# Patient Record
Sex: MALE | Race: WHITE | NOT HISPANIC OR LATINO | Employment: FULL TIME | URBAN - METROPOLITAN AREA
[De-identification: names, ages, dates, MRNs, and addresses within clinical notes are randomized per-mention and may not be internally consistent; named-entity substitution may affect disease eponyms.]

---

## 2017-01-07 ENCOUNTER — COMMUNICATION - HEALTHEAST (OUTPATIENT)
Dept: FAMILY MEDICINE | Facility: CLINIC | Age: 37
End: 2017-01-07

## 2017-01-07 DIAGNOSIS — G47.00 INSOMNIA: ICD-10-CM

## 2017-01-07 DIAGNOSIS — F41.1 ANXIETY STATE: ICD-10-CM

## 2017-03-04 ENCOUNTER — COMMUNICATION - HEALTHEAST (OUTPATIENT)
Dept: FAMILY MEDICINE | Facility: CLINIC | Age: 37
End: 2017-03-04

## 2017-03-04 DIAGNOSIS — F41.1 ANXIETY STATE: ICD-10-CM

## 2017-04-04 ENCOUNTER — COMMUNICATION - HEALTHEAST (OUTPATIENT)
Dept: FAMILY MEDICINE | Facility: CLINIC | Age: 37
End: 2017-04-04

## 2017-04-04 DIAGNOSIS — G47.00 INSOMNIA: ICD-10-CM

## 2017-04-28 ENCOUNTER — COMMUNICATION - HEALTHEAST (OUTPATIENT)
Dept: FAMILY MEDICINE | Facility: CLINIC | Age: 37
End: 2017-04-28

## 2017-04-28 DIAGNOSIS — J45.909 ASTHMA: ICD-10-CM

## 2017-05-05 ENCOUNTER — OFFICE VISIT - HEALTHEAST (OUTPATIENT)
Dept: FAMILY MEDICINE | Facility: CLINIC | Age: 37
End: 2017-05-05

## 2017-05-05 DIAGNOSIS — K21.9 ACID REFLUX: ICD-10-CM

## 2017-05-05 DIAGNOSIS — J30.9 ALLERGIC RHINITIS: ICD-10-CM

## 2017-05-05 DIAGNOSIS — M54.50 LOW BACK PAIN: ICD-10-CM

## 2017-05-05 DIAGNOSIS — M25.532 LEFT WRIST PAIN: ICD-10-CM

## 2017-05-05 DIAGNOSIS — J45.30 MILD PERSISTENT ASTHMA WITHOUT COMPLICATION: ICD-10-CM

## 2017-05-05 DIAGNOSIS — R13.10 DYSPHAGIA: ICD-10-CM

## 2017-05-05 DIAGNOSIS — R14.0 ABDOMINAL BLOATING: ICD-10-CM

## 2017-05-18 ENCOUNTER — COMMUNICATION - HEALTHEAST (OUTPATIENT)
Dept: FAMILY MEDICINE | Facility: CLINIC | Age: 37
End: 2017-05-18

## 2017-05-18 DIAGNOSIS — G47.00 INSOMNIA: ICD-10-CM

## 2017-05-18 DIAGNOSIS — F41.1 ANXIETY STATE: ICD-10-CM

## 2017-06-26 ENCOUNTER — HOSPITAL ENCOUNTER (OUTPATIENT)
Dept: PHYSICAL MEDICINE AND REHAB | Facility: CLINIC | Age: 37
Discharge: HOME | End: 2017-06-26
Attending: FAMILY MEDICINE

## 2017-06-26 DIAGNOSIS — M54.2 CERVICALGIA: ICD-10-CM

## 2017-06-26 DIAGNOSIS — M54.6 THORACIC BACK PAIN: ICD-10-CM

## 2017-06-26 DIAGNOSIS — M54.50 LUMBALGIA: ICD-10-CM

## 2017-06-26 DIAGNOSIS — M79.18 MYOFASCIAL PAIN: ICD-10-CM

## 2017-06-26 ASSESSMENT — MIFFLIN-ST. JEOR: SCORE: 1906.24

## 2017-07-18 ENCOUNTER — COMMUNICATION - HEALTHEAST (OUTPATIENT)
Dept: FAMILY MEDICINE | Facility: CLINIC | Age: 37
End: 2017-07-18

## 2017-07-18 DIAGNOSIS — R52 PAIN: ICD-10-CM

## 2017-07-18 DIAGNOSIS — F41.1 ANXIETY STATE: ICD-10-CM

## 2017-07-18 DIAGNOSIS — M25.561 RIGHT KNEE PAIN, UNSPECIFIED CHRONICITY: ICD-10-CM

## 2017-07-18 DIAGNOSIS — M25.532 LEFT WRIST PAIN: ICD-10-CM

## 2017-07-18 DIAGNOSIS — M25.511 RIGHT SHOULDER PAIN: ICD-10-CM

## 2017-07-20 ENCOUNTER — COMMUNICATION - HEALTHEAST (OUTPATIENT)
Dept: FAMILY MEDICINE | Facility: CLINIC | Age: 37
End: 2017-07-20

## 2017-07-20 DIAGNOSIS — K21.9 GERD (GASTROESOPHAGEAL REFLUX DISEASE): ICD-10-CM

## 2017-10-09 ENCOUNTER — COMMUNICATION - HEALTHEAST (OUTPATIENT)
Dept: FAMILY MEDICINE | Facility: CLINIC | Age: 37
End: 2017-10-09

## 2017-10-09 DIAGNOSIS — F41.1 ANXIETY STATE: ICD-10-CM

## 2017-11-17 ENCOUNTER — COMMUNICATION - HEALTHEAST (OUTPATIENT)
Dept: PHYSICAL MEDICINE AND REHAB | Facility: CLINIC | Age: 37
End: 2017-11-17

## 2017-11-17 DIAGNOSIS — M79.18 MYOFASCIAL PAIN: ICD-10-CM

## 2017-11-17 DIAGNOSIS — M54.2 CERVICALGIA: ICD-10-CM

## 2017-11-17 DIAGNOSIS — M54.6 THORACIC BACK PAIN: ICD-10-CM

## 2017-11-17 DIAGNOSIS — M54.50 LUMBALGIA: ICD-10-CM

## 2017-12-16 ENCOUNTER — COMMUNICATION - HEALTHEAST (OUTPATIENT)
Dept: FAMILY MEDICINE | Facility: CLINIC | Age: 37
End: 2017-12-16

## 2017-12-16 DIAGNOSIS — F41.1 ANXIETY STATE: ICD-10-CM

## 2018-02-22 ENCOUNTER — COMMUNICATION - HEALTHEAST (OUTPATIENT)
Dept: FAMILY MEDICINE | Facility: CLINIC | Age: 38
End: 2018-02-22

## 2018-03-29 ENCOUNTER — COMMUNICATION - HEALTHEAST (OUTPATIENT)
Dept: FAMILY MEDICINE | Facility: CLINIC | Age: 38
End: 2018-03-29

## 2018-03-29 DIAGNOSIS — F41.1 ANXIETY STATE: ICD-10-CM

## 2018-04-25 ENCOUNTER — COMMUNICATION - HEALTHEAST (OUTPATIENT)
Dept: FAMILY MEDICINE | Facility: CLINIC | Age: 38
End: 2018-04-25

## 2018-04-25 DIAGNOSIS — G47.00 INSOMNIA: ICD-10-CM

## 2018-05-08 ENCOUNTER — COMMUNICATION - HEALTHEAST (OUTPATIENT)
Dept: FAMILY MEDICINE | Facility: CLINIC | Age: 38
End: 2018-05-08

## 2018-05-08 DIAGNOSIS — F41.1 ANXIETY STATE: ICD-10-CM

## 2018-05-25 ENCOUNTER — COMMUNICATION - HEALTHEAST (OUTPATIENT)
Dept: FAMILY MEDICINE | Facility: CLINIC | Age: 38
End: 2018-05-25

## 2018-05-25 DIAGNOSIS — J45.30 MILD PERSISTENT ASTHMA: ICD-10-CM

## 2018-05-30 ENCOUNTER — COMMUNICATION - HEALTHEAST (OUTPATIENT)
Dept: FAMILY MEDICINE | Facility: CLINIC | Age: 38
End: 2018-05-30

## 2018-05-30 DIAGNOSIS — J45.909 ASTHMA: ICD-10-CM

## 2018-07-08 ENCOUNTER — COMMUNICATION - HEALTHEAST (OUTPATIENT)
Dept: FAMILY MEDICINE | Facility: CLINIC | Age: 38
End: 2018-07-08

## 2018-07-08 DIAGNOSIS — J45.909 ASTHMA: ICD-10-CM

## 2018-07-30 ENCOUNTER — COMMUNICATION - HEALTHEAST (OUTPATIENT)
Dept: FAMILY MEDICINE | Facility: CLINIC | Age: 38
End: 2018-07-30

## 2018-07-30 DIAGNOSIS — G47.00 INSOMNIA: ICD-10-CM

## 2018-08-30 ENCOUNTER — COMMUNICATION - HEALTHEAST (OUTPATIENT)
Dept: FAMILY MEDICINE | Facility: CLINIC | Age: 38
End: 2018-08-30

## 2018-08-30 DIAGNOSIS — F41.1 ANXIETY STATE: ICD-10-CM

## 2018-09-25 ENCOUNTER — AMBULATORY - HEALTHEAST (OUTPATIENT)
Dept: FAMILY MEDICINE | Facility: CLINIC | Age: 38
End: 2018-09-25

## 2018-09-25 ENCOUNTER — OFFICE VISIT - HEALTHEAST (OUTPATIENT)
Dept: FAMILY MEDICINE | Facility: CLINIC | Age: 38
End: 2018-09-25

## 2018-09-25 DIAGNOSIS — M54.50 BILATERAL LOW BACK PAIN WITHOUT SCIATICA: ICD-10-CM

## 2018-09-25 DIAGNOSIS — M79.671 RIGHT FOOT PAIN: ICD-10-CM

## 2018-09-25 DIAGNOSIS — G47.00 INSOMNIA: ICD-10-CM

## 2018-09-25 DIAGNOSIS — J45.909 ASTHMA: ICD-10-CM

## 2018-09-25 DIAGNOSIS — F41.1 ANXIETY STATE: ICD-10-CM

## 2018-09-25 DIAGNOSIS — K21.9 GERD (GASTROESOPHAGEAL REFLUX DISEASE): ICD-10-CM

## 2018-09-25 DIAGNOSIS — M25.511 RIGHT SHOULDER PAIN: ICD-10-CM

## 2018-09-25 DIAGNOSIS — R10.84 ABDOMINAL PAIN, GENERALIZED: ICD-10-CM

## 2018-09-25 DIAGNOSIS — M25.561 RIGHT KNEE PAIN: ICD-10-CM

## 2018-09-25 DIAGNOSIS — J45.30 MILD PERSISTENT ASTHMA: ICD-10-CM

## 2018-09-25 LAB — TSH SERPL DL<=0.005 MIU/L-ACNC: 0.96 UIU/ML (ref 0.3–5)

## 2018-09-26 ENCOUNTER — COMMUNICATION - HEALTHEAST (OUTPATIENT)
Dept: PHYSICAL MEDICINE AND REHAB | Facility: CLINIC | Age: 38
End: 2018-09-26

## 2018-09-26 DIAGNOSIS — M54.2 CERVICALGIA: ICD-10-CM

## 2018-09-26 DIAGNOSIS — M54.50 LUMBALGIA: ICD-10-CM

## 2018-09-26 DIAGNOSIS — M54.6 THORACIC BACK PAIN: ICD-10-CM

## 2018-09-26 DIAGNOSIS — M79.18 MYOFASCIAL PAIN: ICD-10-CM

## 2018-09-27 ENCOUNTER — COMMUNICATION - HEALTHEAST (OUTPATIENT)
Dept: FAMILY MEDICINE | Facility: CLINIC | Age: 38
End: 2018-09-27

## 2018-09-27 LAB
GLIADIN IGA SER-ACNC: 0.9 U/ML
GLIADIN IGG SER-ACNC: 0.7 U/ML
IGA SERPL-MCNC: 162 MG/DL (ref 65–400)
TTG IGA SER-ACNC: 0.3 U/ML
TTG IGG SER-ACNC: <0.6 U/ML

## 2018-12-27 ENCOUNTER — COMMUNICATION - HEALTHEAST (OUTPATIENT)
Dept: FAMILY MEDICINE | Facility: CLINIC | Age: 38
End: 2018-12-27

## 2018-12-27 DIAGNOSIS — F41.1 ANXIETY STATE: ICD-10-CM

## 2019-02-12 ENCOUNTER — COMMUNICATION - HEALTHEAST (OUTPATIENT)
Dept: FAMILY MEDICINE | Facility: CLINIC | Age: 39
End: 2019-02-12

## 2019-02-12 DIAGNOSIS — F41.1 ANXIETY STATE: ICD-10-CM

## 2019-02-14 ENCOUNTER — COMMUNICATION - HEALTHEAST (OUTPATIENT)
Dept: FAMILY MEDICINE | Facility: CLINIC | Age: 39
End: 2019-02-14

## 2019-02-14 DIAGNOSIS — G47.00 INSOMNIA: ICD-10-CM

## 2019-03-05 ENCOUNTER — RECORDS - HEALTHEAST (OUTPATIENT)
Dept: ADMINISTRATIVE | Facility: OTHER | Age: 39
End: 2019-03-05

## 2019-04-10 ENCOUNTER — COMMUNICATION - HEALTHEAST (OUTPATIENT)
Dept: FAMILY MEDICINE | Facility: CLINIC | Age: 39
End: 2019-04-10

## 2019-04-10 DIAGNOSIS — F41.1 ANXIETY STATE: ICD-10-CM

## 2019-04-30 ENCOUNTER — COMMUNICATION - HEALTHEAST (OUTPATIENT)
Dept: FAMILY MEDICINE | Facility: CLINIC | Age: 39
End: 2019-04-30

## 2019-04-30 DIAGNOSIS — J45.30 MILD PERSISTENT ASTHMA: ICD-10-CM

## 2019-06-19 ENCOUNTER — COMMUNICATION - HEALTHEAST (OUTPATIENT)
Dept: FAMILY MEDICINE | Facility: CLINIC | Age: 39
End: 2019-06-19

## 2019-06-19 DIAGNOSIS — F41.1 ANXIETY STATE: ICD-10-CM

## 2019-06-23 ENCOUNTER — COMMUNICATION - HEALTHEAST (OUTPATIENT)
Dept: FAMILY MEDICINE | Facility: CLINIC | Age: 39
End: 2019-06-23

## 2019-06-23 DIAGNOSIS — J45.909 ASTHMA: ICD-10-CM

## 2019-06-25 ENCOUNTER — COMMUNICATION - HEALTHEAST (OUTPATIENT)
Dept: FAMILY MEDICINE | Facility: CLINIC | Age: 39
End: 2019-06-25

## 2019-06-25 DIAGNOSIS — J45.30 MILD PERSISTENT ASTHMA: ICD-10-CM

## 2019-07-21 ENCOUNTER — COMMUNICATION - HEALTHEAST (OUTPATIENT)
Dept: FAMILY MEDICINE | Facility: CLINIC | Age: 39
End: 2019-07-21

## 2019-07-21 DIAGNOSIS — J45.909 ASTHMA: ICD-10-CM

## 2019-07-28 ENCOUNTER — COMMUNICATION - HEALTHEAST (OUTPATIENT)
Dept: FAMILY MEDICINE | Facility: CLINIC | Age: 39
End: 2019-07-28

## 2019-07-28 DIAGNOSIS — J45.30 MILD PERSISTENT ASTHMA: ICD-10-CM

## 2019-08-16 ENCOUNTER — COMMUNICATION - HEALTHEAST (OUTPATIENT)
Dept: FAMILY MEDICINE | Facility: CLINIC | Age: 39
End: 2019-08-16

## 2019-08-16 DIAGNOSIS — F41.1 ANXIETY STATE: ICD-10-CM

## 2019-09-30 ENCOUNTER — COMMUNICATION - HEALTHEAST (OUTPATIENT)
Dept: FAMILY MEDICINE | Facility: CLINIC | Age: 39
End: 2019-09-30

## 2019-09-30 DIAGNOSIS — K21.9 GERD (GASTROESOPHAGEAL REFLUX DISEASE): ICD-10-CM

## 2019-11-20 ENCOUNTER — COMMUNICATION - HEALTHEAST (OUTPATIENT)
Dept: FAMILY MEDICINE | Facility: CLINIC | Age: 39
End: 2019-11-20

## 2019-11-20 DIAGNOSIS — G47.00 INSOMNIA: ICD-10-CM

## 2019-11-22 ENCOUNTER — COMMUNICATION - HEALTHEAST (OUTPATIENT)
Dept: FAMILY MEDICINE | Facility: CLINIC | Age: 39
End: 2019-11-22

## 2019-11-22 DIAGNOSIS — F41.1 ANXIETY STATE: ICD-10-CM

## 2019-12-06 ENCOUNTER — COMMUNICATION - HEALTHEAST (OUTPATIENT)
Dept: FAMILY MEDICINE | Facility: CLINIC | Age: 39
End: 2019-12-06

## 2019-12-06 DIAGNOSIS — F41.1 ANXIETY STATE: ICD-10-CM

## 2019-12-06 DIAGNOSIS — J45.30 MILD PERSISTENT ASTHMA: ICD-10-CM

## 2020-01-03 ENCOUNTER — COMMUNICATION - HEALTHEAST (OUTPATIENT)
Dept: FAMILY MEDICINE | Facility: CLINIC | Age: 40
End: 2020-01-03

## 2020-01-03 DIAGNOSIS — J45.909 ASTHMA: ICD-10-CM

## 2020-02-14 ENCOUNTER — COMMUNICATION - HEALTHEAST (OUTPATIENT)
Dept: FAMILY MEDICINE | Facility: CLINIC | Age: 40
End: 2020-02-14

## 2020-02-14 DIAGNOSIS — J45.909 ASTHMA: ICD-10-CM

## 2020-02-16 ENCOUNTER — COMMUNICATION - HEALTHEAST (OUTPATIENT)
Dept: FAMILY MEDICINE | Facility: CLINIC | Age: 40
End: 2020-02-16

## 2020-02-16 DIAGNOSIS — F41.1 ANXIETY STATE: ICD-10-CM

## 2020-03-11 ENCOUNTER — COMMUNICATION - HEALTHEAST (OUTPATIENT)
Dept: FAMILY MEDICINE | Facility: CLINIC | Age: 40
End: 2020-03-11

## 2020-03-11 DIAGNOSIS — J45.909 ASTHMA: ICD-10-CM

## 2020-03-15 RX ORDER — FLUTICASONE PROPIONATE AND SALMETEROL XINAFOATE 230; 21 UG/1; UG/1
AEROSOL, METERED RESPIRATORY (INHALATION)
Qty: 1 INHALER | Refills: 0 | Status: SHIPPED | OUTPATIENT
Start: 2020-03-15 | End: 2022-08-01

## 2020-03-23 ENCOUNTER — OFFICE VISIT - HEALTHEAST (OUTPATIENT)
Dept: FAMILY MEDICINE | Facility: CLINIC | Age: 40
End: 2020-03-23

## 2020-03-23 DIAGNOSIS — M25.511 CHRONIC RIGHT SHOULDER PAIN: ICD-10-CM

## 2020-03-23 DIAGNOSIS — J30.9 ALLERGIC RHINITIS, UNSPECIFIED SEASONALITY, UNSPECIFIED TRIGGER: ICD-10-CM

## 2020-03-23 DIAGNOSIS — G89.29 CHRONIC PAIN OF RIGHT KNEE: ICD-10-CM

## 2020-03-23 DIAGNOSIS — G89.29 CHRONIC RIGHT SHOULDER PAIN: ICD-10-CM

## 2020-03-23 DIAGNOSIS — J45.30 MILD PERSISTENT ASTHMA WITHOUT COMPLICATION: ICD-10-CM

## 2020-03-23 DIAGNOSIS — M25.561 CHRONIC PAIN OF RIGHT KNEE: ICD-10-CM

## 2020-03-23 DIAGNOSIS — F41.1 ANXIETY STATE: ICD-10-CM

## 2020-06-18 ENCOUNTER — COMMUNICATION - HEALTHEAST (OUTPATIENT)
Dept: FAMILY MEDICINE | Facility: CLINIC | Age: 40
End: 2020-06-18

## 2020-06-18 DIAGNOSIS — F41.1 ANXIETY STATE: ICD-10-CM

## 2020-09-22 ENCOUNTER — COMMUNICATION - HEALTHEAST (OUTPATIENT)
Dept: FAMILY MEDICINE | Facility: CLINIC | Age: 40
End: 2020-09-22

## 2020-09-22 DIAGNOSIS — G47.00 INSOMNIA: ICD-10-CM

## 2020-09-23 ENCOUNTER — COMMUNICATION - HEALTHEAST (OUTPATIENT)
Dept: FAMILY MEDICINE | Facility: CLINIC | Age: 40
End: 2020-09-23

## 2020-09-23 DIAGNOSIS — J45.30 MILD PERSISTENT ASTHMA WITHOUT COMPLICATION: ICD-10-CM

## 2020-09-24 RX ORDER — TRAZODONE HYDROCHLORIDE 50 MG/1
TABLET, FILM COATED ORAL
Qty: 180 TABLET | Refills: 3 | Status: SHIPPED | OUTPATIENT
Start: 2020-09-24 | End: 2021-08-24

## 2020-10-23 ENCOUNTER — COMMUNICATION - HEALTHEAST (OUTPATIENT)
Dept: FAMILY MEDICINE | Facility: CLINIC | Age: 40
End: 2020-10-23

## 2020-10-23 DIAGNOSIS — K21.9 GERD (GASTROESOPHAGEAL REFLUX DISEASE): ICD-10-CM

## 2021-02-05 ENCOUNTER — COMMUNICATION - HEALTHEAST (OUTPATIENT)
Dept: FAMILY MEDICINE | Facility: CLINIC | Age: 41
End: 2021-02-05

## 2021-02-05 DIAGNOSIS — F41.1 ANXIETY STATE: ICD-10-CM

## 2021-02-05 DIAGNOSIS — J45.30 MILD PERSISTENT ASTHMA WITHOUT COMPLICATION: ICD-10-CM

## 2021-03-01 ENCOUNTER — COMMUNICATION - HEALTHEAST (OUTPATIENT)
Dept: FAMILY MEDICINE | Facility: CLINIC | Age: 41
End: 2021-03-01

## 2021-03-01 DIAGNOSIS — J45.30 MILD PERSISTENT ASTHMA WITHOUT COMPLICATION: ICD-10-CM

## 2021-03-26 ENCOUNTER — COMMUNICATION - HEALTHEAST (OUTPATIENT)
Dept: FAMILY MEDICINE | Facility: CLINIC | Age: 41
End: 2021-03-26

## 2021-03-26 DIAGNOSIS — J45.30 MILD PERSISTENT ASTHMA WITHOUT COMPLICATION: ICD-10-CM

## 2021-05-05 ENCOUNTER — COMMUNICATION - HEALTHEAST (OUTPATIENT)
Dept: FAMILY MEDICINE | Facility: CLINIC | Age: 41
End: 2021-05-05

## 2021-05-05 DIAGNOSIS — F41.1 ANXIETY STATE: ICD-10-CM

## 2021-05-05 DIAGNOSIS — J45.30 MILD PERSISTENT ASTHMA WITHOUT COMPLICATION: ICD-10-CM

## 2021-05-06 ENCOUNTER — COMMUNICATION - HEALTHEAST (OUTPATIENT)
Dept: FAMILY MEDICINE | Facility: CLINIC | Age: 41
End: 2021-05-06

## 2021-05-27 NOTE — TELEPHONE ENCOUNTER
Controlled Substance Refill Request  Medication:   Requested Prescriptions     Pending Prescriptions Disp Refills     LORazepam (ATIVAN) 1 MG tablet [Pharmacy Med Name: LORAZEPAM 1 MG TABLET] 10 tablet 0     Sig: TAKE 1 TABLET BY MOUTH EVERY 6 HOURS AS NEEDED FOR ANXIETY. NEEDS OFFICE VISIT.     Date Last Fill: 12/31/18  [id number]   Submit electronically to pharmacy  Controlled Substance Agreement on File:   Encounter-Level CSA Scan Date:    There are no encounter-level csa scan date.       Last office visit: Last office visit pertaining to requested medication was 9/25/18

## 2021-05-28 ASSESSMENT — ASTHMA QUESTIONNAIRES: ACT_TOTALSCORE: 20

## 2021-05-28 NOTE — TELEPHONE ENCOUNTER
Refill Approved    Rx renewed per Medication Renewal Policy. Medication was last renewed on 9/25/18.    Ledy Grey, [facility] Triage/Med Refill 4/30/2019     Requested Prescriptions   Pending Prescriptions Disp Refills     albuterol (PROAIR HFA;PROVENTIL HFA;VENTOLIN HFA) 90 mcg/actuation inhaler [Pharmacy Med Name: ALBUTEROL HFA (PROAIR) INHALER] 8.5 Inhaler 5     Sig: INHALE 2 PUFFS INTO THE LUNGS EVERY 6 HOURS AS NEEDED FOR WHEEZING       Albuterol/Levalbuterol Refill Protocol Passed - 4/30/2019  1:35 AM        Passed - PCP or prescribing provider visit in last year     Last office visit with prescriber/PCP: 9/25/2018 Anibal Staton MD OR same dept: 9/25/2018 Anibal Staton MD OR same specialty: 9/25/2018 Anibal Staton MD Last physical: Visit date not found       Next appt within 3 mo: Visit date not found  Next physical within 3 mo: Visit date not found  Prescriber OR PCP: Anibal Staton MD  Last diagnosis associated with med order: 1. Mild persistent asthma  - albuterol (PROAIR HFA;PROVENTIL HFA;VENTOLIN HFA) 90 mcg/actuation inhaler [Pharmacy Med Name: ALBUTEROL HFA (PROAIR) INHALER]; INHALE 2 PUFFS INTO THE LUNGS EVERY 6 HOURS AS NEEDED FOR WHEEZING  Dispense: 8.5 Inhaler; Refill: 5    If protocol passes may refill for 6 months if within 3 months of last provider visit (or a total of 9 months). If patient requesting >1 inhaler per month refill x 6 months and have patient make appointment with provider.

## 2021-05-29 ENCOUNTER — COMMUNICATION - HEALTHEAST (OUTPATIENT)
Dept: FAMILY MEDICINE | Facility: CLINIC | Age: 41
End: 2021-05-29

## 2021-05-29 DIAGNOSIS — J45.30 MILD PERSISTENT ASTHMA WITHOUT COMPLICATION: ICD-10-CM

## 2021-05-29 NOTE — TELEPHONE ENCOUNTER
RN cannot approve Refill Request    RN can NOT refill this medication Protocol failed and NO refill given. Last office visit: 9/25/2018 Anibal Staton MD Last Physical: Visit date not found Last MTM visit: Visit date not found Last visit same specialty: 9/25/2018 Anibal Staton MD.  Next visit within 3 mo: Visit date not found  Next physical within 3 mo: Visit date not found      Tracey Pang, [facility] Triage/Med Refill 6/23/2019    Requested Prescriptions   Pending Prescriptions Disp Refills     ADVAIR -21 mcg/actuation inhaler [Pharmacy Med Name: ADVAIR -21 MCG INHALER] 12 Inhaler 0     Sig: TAKE 2 PUFFS BY MOUTH TWICE A DAY-DUE TO BE SEEN FOR FURTHER REFILLS       There is no refill protocol information for this order

## 2021-05-30 VITALS — BODY MASS INDEX: 31.15 KG/M2 | WEIGHT: 214 LBS

## 2021-05-30 RX ORDER — ALBUTEROL SULFATE 90 UG/1
AEROSOL, METERED RESPIRATORY (INHALATION)
Qty: 18 INHALER | Refills: 0 | Status: SHIPPED | OUTPATIENT
Start: 2021-05-30 | End: 2022-09-06

## 2021-05-30 NOTE — TELEPHONE ENCOUNTER
Refill Approved    Rx renewed per Medication Renewal Policy. Medication was last renewed on 4/30/19.    Ledy Grey, [facility] Triage/Med Refill 6/26/2019     Requested Prescriptions   Pending Prescriptions Disp Refills     albuterol (PROAIR HFA;PROVENTIL HFA;VENTOLIN HFA) 90 mcg/actuation inhaler [Pharmacy Med Name: ALBUTEROL HFA (PROAIR) INHALER] 17 Inhaler 2     Sig: INHALE 2 PUFFS INTO THE LUNGS EVERY 6 HOURS AS NEEDED FOR WHEEZING       Albuterol/Levalbuterol Refill Protocol Passed - 6/25/2019  1:33 AM        Passed - PCP or prescribing provider visit in last year     Last office visit with prescriber/PCP: 9/25/2018 Anibal Staton MD OR same dept: 9/25/2018 Anibal Staton MD OR same specialty: 9/25/2018 Anibal Staton MD Last physical: Visit date not found       Next appt within 3 mo: Visit date not found  Next physical within 3 mo: Visit date not found  Prescriber OR PCP: Anibal Staton MD  Last diagnosis associated with med order: 1. Mild persistent asthma  - albuterol (PROAIR HFA;PROVENTIL HFA;VENTOLIN HFA) 90 mcg/actuation inhaler [Pharmacy Med Name: ALBUTEROL HFA (PROAIR) INHALER]; INHALE 2 PUFFS INTO THE LUNGS EVERY 6 HOURS AS NEEDED FOR WHEEZING  Dispense: 17 Inhaler; Refill: 2    If protocol passes may refill for 6 months if within 3 months of last provider visit (or a total of 9 months). If patient requesting >1 inhaler per month refill x 6 months and have patient make appointment with provider.

## 2021-05-30 NOTE — TELEPHONE ENCOUNTER
RN cannot approve Refill Request    RN can NOT refill this medication med is not covered by policy/route to provider. Last office visit: 9/25/2018 Anibal Staton MD Last Physical: Visit date not found Last MTM visit: Visit date not found Last visit same specialty: 9/25/2018 Anibal Staton MD.  Next visit within 3 mo: Visit date not found  Next physical within 3 mo: Visit date not found      Beverly Srinivasan, [facility] Triage/Med Refill 7/21/2019    Requested Prescriptions   Pending Prescriptions Disp Refills     ADVAIR -21 mcg/actuation inhaler [Pharmacy Med Name: ADVAIR -21 MCG INHALER] 12 Inhaler 0     Sig: TAKE 2 PUFFS BY MOUTH TWICE A DAY-DUE TO BE SEEN FOR FURTHER REFILLS       There is no refill protocol information for this order

## 2021-05-30 NOTE — TELEPHONE ENCOUNTER
Refill Approved    Rx renewed per Medication Renewal Policy. Medication was last renewed on 6/26/19, last OV 9/25/18.    Tracey Pang, [facility] Triage/Med Refill 7/28/2019     Requested Prescriptions   Pending Prescriptions Disp Refills     albuterol (PROAIR HFA;PROVENTIL HFA;VENTOLIN HFA) 90 mcg/actuation inhaler [Pharmacy Med Name: ALBUTEROL HFA (PROAIR) INHALER] 8.5 Inhaler 0     Sig: TAKE 2 PUFFS BY MOUTH EVERY 6 HOURS AS NEEDED FOR WHEEZE       Albuterol/Levalbuterol Refill Protocol Passed - 7/28/2019  8:37 AM        Passed - PCP or prescribing provider visit in last year     Last office visit with prescriber/PCP: 9/25/2018 Anibal Staton MD OR same dept: 9/25/2018 Anibal Staton MD OR same specialty: 9/25/2018 Anibal Staton MD Last physical: Visit date not found       Next appt within 3 mo: Visit date not found  Next physical within 3 mo: Visit date not found  Prescriber OR PCP: Anibal Staton MD  Last diagnosis associated with med order: 1. Mild persistent asthma  - albuterol (PROAIR HFA;PROVENTIL HFA;VENTOLIN HFA) 90 mcg/actuation inhaler [Pharmacy Med Name: ALBUTEROL HFA (PROAIR) INHALER]; TAKE 2 PUFFS BY MOUTH EVERY 6 HOURS AS NEEDED FOR WHEEZE  Dispense: 8.5 Inhaler; Refill: 0    If protocol passes may refill for 6 months if within 3 months of last provider visit (or a total of 9 months). If patient requesting >1 inhaler per month refill x 6 months and have patient make appointment with provider.

## 2021-05-31 VITALS — WEIGHT: 217.8 LBS | HEIGHT: 70 IN | BODY MASS INDEX: 31.18 KG/M2

## 2021-05-31 NOTE — TELEPHONE ENCOUNTER
Controlled Substance Refill Request  Medication:   Requested Prescriptions     Pending Prescriptions Disp Refills     LORazepam (ATIVAN) 1 MG tablet [Pharmacy Med Name: LORAZEPAM 1 MG TABLET] 10 tablet 0     Sig: TAKE 1 TABLET BY MOUTH EVERY 6 HOURS AS NEEDED FOR ANXIETY. NEEDS OFFICE VISIT.     Date Last Fill: 2/14/19  [id number]   Submit electronically to pharmacy  Controlled Substance Agreement on File:   Encounter-Level CSA Scan Date:    There are no encounter-level csa scan date.       Last office visit: Last office visit pertaining to requested medication was 9/25/18.

## 2021-06-01 VITALS — WEIGHT: 219 LBS | BODY MASS INDEX: 31.88 KG/M2

## 2021-06-01 NOTE — TELEPHONE ENCOUNTER
RN cannot approve Refill Request    RN can NOT refill this medication Protocol failed and NO refill given        Ledy Grey, [facility] Triage/Med Refill 9/30/2019    Requested Prescriptions   Pending Prescriptions Disp Refills     omeprazole (PRILOSEC) 20 MG capsule [Pharmacy Med Name: OMEPRAZOLE DR 20 MG CAPSULE] 90 capsule 3     Sig: TAKE 1 CAPSULE BY MOUTH EVERY DAY       GI Medications Refill Protocol Failed - 9/30/2019  1:58 AM        Failed - PCP or prescribing provider visit in last 12 or next 3 months.     Last office visit with prescriber/PCP: 9/25/2018 Anibal Staton MD OR same dept: Visit date not found OR same specialty: 9/25/2018 Anibal Staton MD  Last physical: Visit date not found Last MTM visit: Visit date not found   Next visit within 3 mo: Visit date not found  Next physical within 3 mo: Visit date not found  Prescriber OR PCP: Anibal Staton MD  Last diagnosis associated with med order: 1. GERD (gastroesophageal reflux disease)  - omeprazole (PRILOSEC) 20 MG capsule [Pharmacy Med Name: OMEPRAZOLE DR 20 MG CAPSULE]; TAKE 1 CAPSULE BY MOUTH EVERY DAY  Dispense: 90 capsule; Refill: 3    If protocol passes may refill for 12 months if within 3 months of last provider visit (or a total of 15 months).

## 2021-06-03 NOTE — TELEPHONE ENCOUNTER
RN cannot approve Refill Request    RN can NOT refill this medication Protocol failed and NO refill given.     Ldey Grey, [facility] Triage/Med Refill 11/21/2019    Requested Prescriptions   Pending Prescriptions Disp Refills     traZODone (DESYREL) 50 MG tablet [Pharmacy Med Name: TRAZODONE 50 MG TABLET] 180 tablet 3     Sig: TAKE 1 TO 2 TABLETS BY MOUTH EVERY DAY AT BEDTIME       Tricyclics/Misc Antidepressant/Antianxiety Meds Refill Protocol Failed - 11/20/2019  2:23 AM        Failed - PCP or prescribing provider visit in last year     Last office visit with prescriber/PCP: 9/25/2018 Anibal Staton MD OR same dept: Visit date not found OR same specialty: 9/25/2018 Anibal Staton MD  Last physical: Visit date not found Last MTM visit: Visit date not found   Next visit within 3 mo: Visit date not found  Next physical within 3 mo: Visit date not found  Prescriber OR PCP: Anibal Staton MD  Last diagnosis associated with med order: 1. Insomnia  - traZODone (DESYREL) 50 MG tablet [Pharmacy Med Name: TRAZODONE 50 MG TABLET]; TAKE 1 TO 2 TABLETS BY MOUTH EVERY DAY AT BEDTIME  Dispense: 180 tablet; Refill: 2    If protocol passes may refill for 12 months if within 3 months of last provider visit (or a total of 15 months).

## 2021-06-03 NOTE — TELEPHONE ENCOUNTER
Controlled Substance Refill Request  Medication:   Requested Prescriptions     Pending Prescriptions Disp Refills     LORazepam (ATIVAN) 1 MG tablet [Pharmacy Med Name: LORAZEPAM 1 MG TABLET] 10 tablet      Sig: TAKE 1 TABLET (1 MG TOTAL) BY MOUTH EVERY 6 (SIX) HOURS AS NEEDED FOR ANXIETY.     Date Last Fill: 8/16/19  [id number]   Submit electronically to pharmacy  Controlled Substance Agreement on File:   Encounter-Level CSA Scan Date:    There are no encounter-level csa scan date.       Last office visit: Last office visit pertaining to requested medication was 9/25/18.

## 2021-06-04 ENCOUNTER — OFFICE VISIT - HEALTHEAST (OUTPATIENT)
Dept: FAMILY MEDICINE | Facility: CLINIC | Age: 41
End: 2021-06-04

## 2021-06-04 ENCOUNTER — COMMUNICATION - HEALTHEAST (OUTPATIENT)
Dept: TELEHEALTH | Facility: CLINIC | Age: 41
End: 2021-06-04

## 2021-06-04 VITALS — WEIGHT: 212 LBS | BODY MASS INDEX: 30.86 KG/M2

## 2021-06-04 DIAGNOSIS — J45.30 MILD PERSISTENT ASTHMA WITHOUT COMPLICATION: ICD-10-CM

## 2021-06-04 DIAGNOSIS — Z00.00 ROUTINE GENERAL MEDICAL EXAMINATION AT A HEALTH CARE FACILITY: ICD-10-CM

## 2021-06-04 DIAGNOSIS — R14.0 ABDOMINAL BLOATING: ICD-10-CM

## 2021-06-04 DIAGNOSIS — F41.1 ANXIETY STATE: ICD-10-CM

## 2021-06-04 LAB
CHOLEST SERPL-MCNC: 236 MG/DL
FASTING STATUS PATIENT QL REPORTED: YES
FASTING STATUS PATIENT QL REPORTED: YES
GLUCOSE BLD-MCNC: 86 MG/DL (ref 70–125)
HDLC SERPL-MCNC: 53 MG/DL
LDLC SERPL CALC-MCNC: 173 MG/DL
TRIGL SERPL-MCNC: 48 MG/DL

## 2021-06-04 RX ORDER — VALACYCLOVIR HYDROCHLORIDE 1 G/1
2000 TABLET, FILM COATED ORAL 2 TIMES DAILY
Status: SHIPPED | COMMUNITY
Start: 2020-07-07 | End: 2023-10-27

## 2021-06-04 RX ORDER — LORAZEPAM 1 MG/1
1 TABLET ORAL EVERY 6 HOURS PRN
Qty: 15 TABLET | Refills: 2 | Status: SHIPPED | OUTPATIENT
Start: 2021-06-04 | End: 2021-11-01

## 2021-06-04 ASSESSMENT — MIFFLIN-ST. JEOR: SCORE: 1889.01

## 2021-06-04 NOTE — TELEPHONE ENCOUNTER
Overdue for office visit for albuterol  - last visit was 11/2018          Controlled Substance Refill Request  Medication:   Requested Prescriptions     Pending Prescriptions Disp Refills     albuterol (PROAIR HFA;PROVENTIL HFA;VENTOLIN HFA) 90 mcg/actuation inhaler [Pharmacy Med Name: ALBUTEROL HFA (PROAIR) INHALER] 8.5 Inhaler 0     Sig: INHALE 2 PUFFS BY MOUTH EVERY 6 HOURS AS NEEDED FOR WHEEZE     LORazepam (ATIVAN) 1 MG tablet [Pharmacy Med Name: LORAZEPAM 1 MG TABLET] 10 tablet 0     Sig: TAKE 1 TABLET BY MOUTH EVERY 6 HOURS AS NEEDED FOR ANXIETY. NEEDS OFFICE VISIT.     Date Last Fill: 8/16/2019         Pharmacy: [facility]    Submit electronically to pharmacy  Controlled Substance Agreement on File:   Encounter-Level CSA Scan Date:    There are no encounter-level csa scan date.       Last office visit: 9/25/2018 Anibal Staton MD

## 2021-06-04 NOTE — TELEPHONE ENCOUNTER
RN cannot approve Refill Request    RN can NOT refill this medication Protocol failed and NO refill given. Last office visit: 9/25/2018 Anibal Staton MD Last Physical: Visit date not found Last MTM visit: Visit date not found Last visit same specialty: 9/25/2018 Anibal Staton MD.  Next visit within 3 mo: Visit date not found  Next physical within 3 mo: Visit date not found      Tracey Pang, [facility] Triage/Med Refill 1/4/2020    Requested Prescriptions   Pending Prescriptions Disp Refills     ADVAIR -21 mcg/actuation inhaler [Pharmacy Med Name: ADVAIR -21 MCG INHALER] 12 Inhaler 0     Sig: TAKE 2 PUFFS BY MOUTH TWICE A DAY-DUE TO BE SEEN FOR FURTHER REFILLS       There is no refill protocol information for this order

## 2021-06-06 ENCOUNTER — COMMUNICATION - HEALTHEAST (OUTPATIENT)
Dept: FAMILY MEDICINE | Facility: CLINIC | Age: 41
End: 2021-06-06

## 2021-06-06 NOTE — TELEPHONE ENCOUNTER
RN cannot approve Refill Request    RN can NOT refill this medication med is not covered by policy/route to provider. Last office visit: 9/25/2018 Anibal Staton MD Last Physical: Visit date not found Last MTM visit: Visit date not found Last visit same specialty: 9/25/2018 Anibal Staton MD.  Next visit within 3 mo: Visit date not found  Next physical within 3 mo: Visit date not found      Beverly Srinivasan, [facility] Triage/Med Refill 2/14/2020    Requested Prescriptions   Pending Prescriptions Disp Refills     ADVAIR -21 mcg/actuation inhaler [Pharmacy Med Name: ADVAIR -21 MCG INHALER] 12 Inhaler 0     Sig: TAKE 2 PUFFS BY MOUTH TWICE A DAY-DUE TO BE SEEN FOR FURTHER REFILLS       There is no refill protocol information for this order

## 2021-06-06 NOTE — TELEPHONE ENCOUNTER
RN cannot approve Refill Request    RN can NOT refill this medication med is not covered by policy/route to provider     . Last office visit: 9/25/2018 Anibal Staton MD Last Physical: Visit date not found Last MTM visit: Visit date not found Last visit same specialty: 9/25/2018 Anibal Staton MD.  Next visit within 3 mo: Visit date not found  Next physical within 3 mo: Visit date not found      Ledy Grey, [facility] Triage/Med Refill 3/14/2020    Requested Prescriptions   Pending Prescriptions Disp Refills     ADVAIR -21 mcg/actuation inhaler [Pharmacy Med Name: ADVAIR -21 MCG INHALER] 12 Inhaler 0     Sig: TAKE 2 PUFFS BY MOUTH TWICE A DAY-DUE TO BE SEEN FOR FURTHER REFILLS       There is no refill protocol information for this order

## 2021-06-06 NOTE — TELEPHONE ENCOUNTER
RN phoned patient with recommendations  Transferred to the [facility] department, she will follow up with Dr. Trula Phoenix    No further questions or concerns  Encounter closed This patient has not been seen since 9/25/2018  He needs an office visit to discuss Lorazepam

## 2021-06-07 NOTE — PROGRESS NOTES
"Tavon Burgos is a 39 y.o. male who is being evaluated via a billable telephone visit.      The patient has been notified of following:     \"This telephone visit will be conducted via a call between you and your physician/provider. We have found that certain health care needs can be provided without the need for a physical exam.  This service lets us provide the care you need with a short phone conversation.  If a prescription is necessary we can send it directly to your pharmacy.  If lab work is needed we can place an order for that and you can then stop by our lab to have the test done at a later time.    If during the course of the call the physician/provider feels a telephone visit is not appropriate, you will not be charged for this service.\"         Tavon Burgos complains of    Chief Complaint   Patient presents with     Medication Management     Asthma     Medication Refill     loraze, inhalers     Anxiety       I have reviewed and updated the patient's Past Medical History, Social History, Family History and Medication List.    ALLERGIES  Patient has no known allergies.    He has longstanding mild persistent asthma that has historically been well controlled with use of Advair and control of allergies.  Patient reports typical triggers are allergens.  He does have some degree of seasonal allergies but there are other less well known allergens that sometimes trigger symptoms.  Respiratory infections also trigger symptoms.  Patient reports no recent respiratory infections.  He does express some concern regarding coronavirus.  We discussed this.  We discussed simple measures to help prevent transmission.  We discussed the importance of maintaining good overall control of his asthma.  Patient does not necessarily use controller medication twice daily we discussed trying to strive for this is much as possible.  Discussed renewing medications.  Discussed additional allergy treatment if indicated.    He " does have anxiety, it is situational.  Flying and/or work stress can be triggers for anxiety that lead to panic-like symptoms at times.  Patient reports use of lorazepam type medication on average 5 times per month.  No evidence of misuse or diversion of medication.  Agreed to prescribe 10 tablets to have on hand for use for severe anxiety.  Reviewed the potential habit-forming/addictive nature of medication especially if it is used more often.    He does have insomnia that is also connected with his anxiety.  He benefits from use of trazodone.  Trazodone does not cause significant side effects.  Trazodone allows for generally restful sleep without anxiety symptoms.    His chronic musculoskeletal pain issues including knee pain and right shoulder pain.  Reports no significant difference compared to prior conversations.  Discussed options for taking further action including specialty referral or further evaluation he declines any offer for additional evaluation or treatment at this time.    Assessment/Plan:      Diagnoses and all orders for this visit:    Mild persistent asthma without complication  -     albuterol (PROAIR HFA;PROVENTIL HFA;VENTOLIN HFA) 90 mcg/actuation inhaler; Inhale 2 puffs every 6 (six) hours as needed for wheezing.  Dispense: 1 each; Refill: 3  -     fluticasone propion-salmeteroL (ADVAIR HFA) 230-21 mcg/actuation inhaler; Inhale 2 puffs 2 (two) times a day.  Dispense: 1 Inhaler; Refill: 12    Anxiety state  -     LORazepam (ATIVAN) 1 MG tablet; Take 1 tablet (1 mg total) by mouth every 6 (six) hours as needed for anxiety. TAKE 1 TABLET (1 MG TOTAL) BY MOUTH EVERY 6 (SIX) HOURS AS NEEDED FOR ANXIETY  Dispense: 10 tablet; Refill: 1    Allergic rhinitis, unspecified seasonality, unspecified trigger    Chronic right shoulder pain    Chronic pain of right knee          Continue current medications for management of asthma.  Recommend continued focus on using controller medication twice  daily.    Prescribe lorazepam, see discussion above.    Continue to maintain activity, if pain in the knee and shoulder become limiting would recommend considering further evaluation and other treatment considerations.      Phone call duration:  10 minutes    Anibal Staton MD

## 2021-06-09 NOTE — TELEPHONE ENCOUNTER
Controlled Substance Refill Request  Medication Name:   Requested Prescriptions     Pending Prescriptions Disp Refills     LORazepam (ATIVAN) 1 MG tablet [Pharmacy Med Name: LORAZEPAM 1 MG TABLET] 10 tablet 1     Sig: TAKE 1 TABLET (1 MG TOTAL) BY MOUTH EVERY 6 (SIX) HOURS AS NEEDED FOR ANXIETY.     Date Last Fill: 3/23/20  Requested Pharmacy: [facility]  Submit electronically to pharmacy  Controlled Substance Agreement on file:   Encounter-Level CSA Scan Date:    There are no encounter-level csa scan date.        Last office visit:  3/23/20

## 2021-06-10 NOTE — PROGRESS NOTES
Patient ID: Tavon Burgos is a 36 y.o. male.  /76  Pulse 75  Wt 214 lb (97.1 kg)  SpO2 97%  BMI 31.15 kg/m2    Assessment/Plan:                   Diagnoses and all orders for this visit:    Mild persistent asthma without complication    Allergic rhinitis    Acid reflux    Dysphagia    Abdominal bloating    Left wrist pain  -     Ambulatory referral to Orthopedics    Low back pain  -     Ambulatory referral to Spine Care    Other orders  -     fluticasone-salmeterol (ADVAIR HFA) 230-21 mcg/actuation inhaler; Inhale 2 puffs 2 (two) times a day.  Dispense: 1 Inhaler; Refill: 12  -     albuterol (PROAIR HFA;PROVENTIL HFA;VENTOLIN HFA) 90 mcg/actuation inhaler; Inhale 2 puffs every 6 (six) hours as needed for wheezing.  Dispense: 1 each; Refill: 11  -     omeprazole (PRILOSEC) 20 MG capsule; Take 1 capsule (20 mg total) by mouth daily.  Dispense: 30 capsule; Refill: 2        DISCUSSION  Asthma: Change to Advair HFA, 2 puffs twice daily.  Continue to use nonsedating antihistamine for allergy control.  I believe using preventive medication as prescribed will help in gaining better control.    For the acid reflux there are no red flag symptoms.  Avoid NSAIDs, discussed dietary changes extensively.  Monitor symptoms closely after initiation of omeprazole.  Hopefully this will help reduce abdominal bloating and other symptoms.  Recommend paying close attention to symptoms that trigger either acid reflux or abdominal bloating.  This may require further investigation if symptoms do not improve completely.    For the back pain no red flag symptoms.  Chronic issue.  Referral to spine clinic.      For the wrist pain we will refer to orthopedic specialty provider.  Recommend use of acetaminophen for pain control consider topical analgesic such as Aspercreme.  Avoid narcotic medication for pain control of this.  Try to minimize NSAIDs because of the concern with the digestive system as described above.  Based on  specialty evaluation will help decide on further course of action in the future.  Subjective:     HPI    Tavon Burgos is a 36-year-old man who is here today to discuss several concerns.    He has mild persistent asthma.  Has used Advair 500-50 for his controller medication.  Typical triggers have been respiratory infections and allergies.  He is typically used Advair once daily.  In the past has reported good control of asthma symptoms but currently reports he is using his rescue inhaler much more frequently.  He feels that the Advair Diskus medication does not get down past his throat.  He denies any significant breathing difficulties currently.  We discussed management of his allergies using nonsedating antihistamines.  We discussed optimization of his allergy and asthma management to overall improve his asthma control.    He has symptoms of dysphagia, acid reflux and stomach bloating.  The symptoms have been going on for some time.  He has not tried medications.  He has identified some foods which seem to trigger this.  He does use NSAIDs on occasion for management of musculoskeletal pain.  He does not report any significant symptoms such as hematemesis hematochezia or melena.  He has had no unexpected weight loss.    He has chronic issues with low back pain.  Unchanged.  Reports tightness that occurs sporadically in certain situations.  We have previously discussed referral to the spine clinic to help aid in evaluation and treatment of his low back pain.  He reports no radiation of pain into the extremities.  He denies any numbness or tingling.    He has chronic left wrist pain.  He has been seen previously by specialty provider who recommended a major surgical procedure.  He did not want to have surgery.  Had intermittently used oxycodone for pain management.  Has not used medication in over 6 months.  Today we discussed avoiding such medication for management of such pain.  He does wish to remain active  and still is able to do a lot of activities but reports pain.  Today we discussed referral to orthopedic specialty provider for reevaluation of his wrist pain concern.    Review of Systems  Complete review of systems is obtained.  Other than the specific considerations noted above complete review of systems is negative.      Objective:   Medications:  Current Outpatient Prescriptions   Medication Sig     LORazepam (ATIVAN) 1 MG tablet TAKE 1 TABLET BY MOUTH EVERY 6 HOURS AS NEEDED FOR ANXIETY.     traZODone (DESYREL) 50 MG tablet TAKE ONE OR TWO TABLETS BY MOUTH NIGHTLY AT BEDTIME     albuterol (PROAIR HFA;PROVENTIL HFA;VENTOLIN HFA) 90 mcg/actuation inhaler Inhale 2 puffs every 6 (six) hours as needed for wheezing.     fluticasone-salmeterol (ADVAIR HFA) 230-21 mcg/actuation inhaler Inhale 2 puffs 2 (two) times a day.     omeprazole (PRILOSEC) 20 MG capsule Take 1 capsule (20 mg total) by mouth daily.     Allergies:  No Known Allergies    Tobacco:   reports that he has never smoked. He does not have any smokeless tobacco history on file.     Physical Exam      /76  Pulse 75  Wt 214 lb (97.1 kg)  SpO2 97%  BMI 31.15 kg/m2        General Appearance:    Alert, cooperative, no distress   Eyes:    No conjunctival irritation, no scleral icterus       Lungs:     Clear to auscultation bilaterally, respirations unlabored   Heart:    Regular rate and rhythm, S1 and S2 normal, no murmur, rub   or gallop   Abdomen:     Soft, non-tender, bowel sounds active all four quadrants,     no masses, no organomegaly   Extremities:  no bony deformities redness bruising or swelling in the wrist.     Skin:   Skin color, texture, turgor normal, no rashes or lesions   Neurologic:   Normal strength and sensation

## 2021-06-11 NOTE — TELEPHONE ENCOUNTER
Medication Question or Clarification  Who is calling: [facility] pharmacy   What medication are you calling about (include dose and sig)?: albuterol (PROAIR HFA;PROVENTIL HFA;VENTOLIN HFA) 90 mcg/actuation inhaler  1 each  3  3/23/2020     Sig - Route: Inhale 2 puffs every 6 (six) hours as needed for wheezing. - Inhalation    Sent to pharmacy as: albuterol sulfate HFA 90 mcg/actuation aerosol inhaler (PROAIR HFA;PROVENTIL HFA;VENTOLIN HFA)   Who prescribed the medication?: Anibal Staton MD  What is your question/concern?: Pharmacy is requesting for alternative for albuterol . Patient insurance will only cover VENTOLIN. Please advise.  Requested Pharmacy: [facility] # [id number]  Okay to leave a detailed message?: No
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
## 2021-06-11 NOTE — TELEPHONE ENCOUNTER
RN cannot approve Refill Request    RN can NOT refill this medication Protocol failed and NO refill given. Last office visit: 9/25/2018 Anibal Staton MD Last Physical: Visit date not found Last MTM visit: Visit date not found Last visit same specialty: 9/25/2018 Anibal Staton MD.  Next visit within 3 mo: Visit date not found  Next physical within 3 mo: Visit date not found      Ledy Grey, [facility] Triage/Med Refill 9/24/2020    Requested Prescriptions   Pending Prescriptions Disp Refills     traZODone (DESYREL) 50 MG tablet [Pharmacy Med Name: TRAZODONE 50 MG TABLET] 180 tablet 3     Sig: TAKE 1 TO 2 TABLETS BY MOUTH EVERY DAY AT BEDTIME       Tricyclics/Misc Antidepressant/Antianxiety Meds Refill Protocol Failed - 9/22/2020 10:10 PM        Failed - PCP or prescribing provider visit in last year     Last office visit with prescriber/PCP: 9/25/2018 Anibal Staton MD OR same dept: Visit date not found OR same specialty: 9/25/2018 Anibal Staton MD  Last physical: Visit date not found Last MTM visit: Visit date not found   Next visit within 3 mo: Visit date not found  Next physical within 3 mo: Visit date not found  Prescriber OR PCP: Anibal Staton MD  Last diagnosis associated with med order: 1. Insomnia  - traZODone (DESYREL) 50 MG tablet [Pharmacy Med Name: TRAZODONE 50 MG TABLET]; TAKE 1 TO 2 TABLETS BY MOUTH EVERY DAY AT BEDTIME  Dispense: 180 tablet; Refill: 3    If protocol passes may refill for 12 months if within 3 months of last provider visit (or a total of 15 months).

## 2021-06-12 NOTE — TELEPHONE ENCOUNTER
RN cannot approve Refill Request    RN can NOT refill this medication PCP messaged that patient is overdue for Office Visit. Last office visit: 9/25/2018 Anibal Staton MD Last Physical: Visit date not found Last MTM visit: Visit date not found Last visit same specialty: 9/25/2018 Anibal Staton MD.  Next visit within 3 mo: Visit date not found  Next physical within 3 mo: Visit date not found      Tracey Pang, [facility] Triage/Med Refill 10/24/2020    Requested Prescriptions   Pending Prescriptions Disp Refills     omeprazole (PRILOSEC) 20 MG capsule [Pharmacy Med Name: OMEPRAZOLE DR 20 MG CAPSULE] 90 capsule 3     Sig: TAKE 1 CAPSULE BY MOUTH EVERY DAY       GI Medications Refill Protocol Failed - 10/23/2020  6:18 PM        Failed - PCP or prescribing provider visit in last 12 or next 3 months.     Last office visit with prescriber/PCP: 9/25/2018 Anibal Staton MD OR same dept: Visit date not found OR same specialty: 9/25/2018 Anibal Staton MD  Last physical: Visit date not found Last MTM visit: Visit date not found   Next visit within 3 mo: Visit date not found  Next physical within 3 mo: Visit date not found  Prescriber OR PCP: Anibal Staton MD  Last diagnosis associated with med order: 1. GERD (gastroesophageal reflux disease)  - omeprazole (PRILOSEC) 20 MG capsule [Pharmacy Med Name: OMEPRAZOLE DR 20 MG CAPSULE]; TAKE 1 CAPSULE BY MOUTH EVERY DAY  Dispense: 90 capsule; Refill: 3    If protocol passes may refill for 12 months if within 3 months of last provider visit (or a total of 15 months).

## 2021-06-14 ENCOUNTER — RECORDS - HEALTHEAST (OUTPATIENT)
Dept: ADMINISTRATIVE | Facility: OTHER | Age: 41
End: 2021-06-14

## 2021-06-15 NOTE — TELEPHONE ENCOUNTER
RN cannot approve Refill Request    RN can NOT refill this medication Protocol failed and NO refill given. Last office visit: 9/25/2018 Anibal Staton MD Last Physical: Visit date not found Last MTM visit: Visit date not found Last visit same specialty: 9/25/2018 Anibal Staton MD.  Next visit within 3 mo: Visit date not found  Next physical within 3 mo: Visit date not found      Adam Alexander, [facility] Triage/Med Refill 2/5/2021    Requested Prescriptions   Pending Prescriptions Disp Refills     VENTOLIN HFA 90 mcg/actuation inhaler [Pharmacy Med Name: VENTOLIN HFA 90 MCG INHALER] 18 Inhaler 0     Sig: TAKE 2 PUFFS BY MOUTH EVERY 6 HOURS AS NEEDED FOR WHEEZE       Albuterol/Levalbuterol Refill Protocol Failed - 2/5/2021 12:30 AM        Failed - PCP or prescribing provider visit in last year     Last office visit with prescriber/PCP: 9/25/2018 Anibal Staton MD OR same dept: Visit date not found OR same specialty: 9/25/2018 Anibal Staton MD Last physical: Visit date not found       Next appt within 3 mo: Visit date not found  Next physical within 3 mo: Visit date not found  Prescriber OR PCP: Anibal Staton MD  Last diagnosis associated with med order: 1. Anxiety state  - LORazepam (ATIVAN) 1 MG tablet [Pharmacy Med Name: LORAZEPAM 1 MG TABLET]; TAKE 1 TABLET BY MOUTH EVERY 6 HOURS AS NEEDED FOR ANXIETY. NEEDS OFFICE VISIT.  Dispense: 10 tablet; Refill: 0    If protocol passes may refill for 6 months if within 3 months of last provider visit (or a total of 9 months). If patient requesting >1 inhaler per month refill x 6 months and have patient make appointment with provider.             LORazepam (ATIVAN) 1 MG tablet [Pharmacy Med Name: LORAZEPAM 1 MG TABLET] 10 tablet 0     Sig: TAKE 1 TABLET BY MOUTH EVERY 6 HOURS AS NEEDED FOR ANXIETY. NEEDS OFFICE VISIT.       Controlled Substances Refill Protocol Failed - 2/5/2021 12:30 AM        Failed - Route all Controlled Substance Requests to  Provider        Failed - Patient has controlled substance agreement in past 12 months     Encounter-Level CSA Scan Date:    There are no encounter-level csa scan date.               Failed - Visit with PCP or prescribing provider visit in past 12 months      Last office visit with prescriber/PCP: 9/25/2018 Anibal Staton MD OR same dept: Visit date not found OR same specialty: 9/25/2018 Anibal Staton MD Last physical: Visit date not found Last MTM visit: Visit date not found    Next visit within 3 mo: Visit date not found  Next physical within 3 mo: Visit date not found  Prescriber OR PCP: Anibal Staton MD  Last diagnosis associated with med order: 1. Anxiety state  - LORazepam (ATIVAN) 1 MG tablet [Pharmacy Med Name: LORAZEPAM 1 MG TABLET]; TAKE 1 TABLET BY MOUTH EVERY 6 HOURS AS NEEDED FOR ANXIETY. NEEDS OFFICE VISIT.  Dispense: 10 tablet; Refill: 0

## 2021-06-15 NOTE — TELEPHONE ENCOUNTER
RN cannot approve Refill Request    RN can NOT refill this medication Protocol failed and NO refill given. Last office visit: 9/25/2018 Anibal Staton MD Last Physical: Visit date not found Last MTM visit: Visit date not found Last visit same specialty: 9/25/2018 Anibal Staton MD.  Next visit within 3 mo: Visit date not found  Next physical within 3 mo: Visit date not found      Adam Alexander, [facility] Triage/Med Refill 3/1/2021    Requested Prescriptions   Pending Prescriptions Disp Refills     VENTOLIN HFA 90 mcg/actuation inhaler [Pharmacy Med Name: VENTOLIN HFA 90 MCG INHALER] 18 Inhaler 0     Sig: INHALE 2 PUFFS BY MOUTH EVERY 6 HOURS AS NEEDED FOR WHEEZE       Albuterol/Levalbuterol Refill Protocol Failed - 3/1/2021 12:21 AM        Failed - PCP or prescribing provider visit in last year     Last office visit with prescriber/PCP: 9/25/2018 Anibal Staton MD OR same dept: Visit date not found OR same specialty: 9/25/2018 Anibal Staton MD Last physical: Visit date not found       Next appt within 3 mo: Visit date not found  Next physical within 3 mo: Visit date not found  Prescriber OR PCP: Anibal Staton MD  Last diagnosis associated with med order: 1. Mild persistent asthma without complication  - VENTOLIN HFA 90 mcg/actuation inhaler [Pharmacy Med Name: VENTOLIN HFA 90 MCG INHALER]; INHALE 2 PUFFS BY MOUTH EVERY 6 HOURS AS NEEDED FOR WHEEZE  Dispense: 18 Inhaler; Refill: 0    If protocol passes may refill for 6 months if within 3 months of last provider visit (or a total of 9 months). If patient requesting >1 inhaler per month refill x 6 months and have patient make appointment with provider.

## 2021-06-15 NOTE — TELEPHONE ENCOUNTER
Controlled Substance Refill Request  Medication Name:   Requested Prescriptions     Pending Prescriptions Disp Refills     VENTOLIN HFA 90 mcg/actuation inhaler [Pharmacy Med Name: VENTOLIN HFA 90 MCG INHALER] 18 Inhaler 0     Sig: TAKE 2 PUFFS BY MOUTH EVERY 6 HOURS AS NEEDED FOR WHEEZE     LORazepam (ATIVAN) 1 MG tablet [Pharmacy Med Name: LORAZEPAM 1 MG TABLET] 10 tablet 0     Sig: TAKE 1 TABLET BY MOUTH EVERY 6 HOURS AS NEEDED FOR ANXIETY. NEEDS OFFICE VISIT.     Date Last Fill: 6/19/20  Requested Pharmacy: [facility]  Submit electronically to pharmacy  Controlled Substance Agreement on file:   Encounter-Level CSA Scan Date:    There are no encounter-level csa scan date.        Last office visit:  3/23/20

## 2021-06-16 NOTE — TELEPHONE ENCOUNTER
RN cannot approve Refill Request    RN can NOT refill this medication Protocol failed and NO refill given. Last office visit: 9/25/2018 Anibal Staton MD Last Physical: Visit date not found Last MTM visit: Visit date not found Last visit same specialty: 9/25/2018 Anibal Staton MD.  Next visit within 3 mo: Visit date not found  Next physical within 3 mo: Visit date not found      Adam Alexander, [facility] Triage/Med Refill 3/26/2021    Requested Prescriptions   Pending Prescriptions Disp Refills     VENTOLIN HFA 90 mcg/actuation inhaler [Pharmacy Med Name: VENTOLIN HFA 90 MCG INHALER] 18 Inhaler 0     Sig: INHALE 2 PUFFS BY MOUTH EVERY 6 HOURS AS NEEDED FOR WHEEZING       Albuterol/Levalbuterol Refill Protocol Failed - 3/26/2021 12:59 AM        Failed - PCP or prescribing provider visit in last year     Last office visit with prescriber/PCP: 9/25/2018 Anibal Staton MD OR same dept: Visit date not found OR same specialty: 9/25/2018 Anibal Staton MD Last physical: Visit date not found       Next appt within 3 mo: Visit date not found  Next physical within 3 mo: Visit date not found  Prescriber OR PCP: Anibal Staton MD  Last diagnosis associated with med order: 1. Mild persistent asthma without complication  - VENTOLIN HFA 90 mcg/actuation inhaler [Pharmacy Med Name: VENTOLIN HFA 90 MCG INHALER]; INHALE 2 PUFFS BY MOUTH EVERY 6 HOURS AS NEEDED FOR WHEEZING  Dispense: 18 Inhaler; Refill: 0    If protocol passes may refill for 6 months if within 3 months of last provider visit (or a total of 9 months). If patient requesting >1 inhaler per month refill x 6 months and have patient make appointment with provider.

## 2021-06-17 NOTE — TELEPHONE ENCOUNTER
RN cannot approve Refill Request    RN can NOT refill this medication PCP messaged that patient is overdue for Office Visit and Controlled Substance and med is not covered by policy/route to provider. Last office visit: 9/25/2018 Anibal Staton MD Last Physical: Visit date not found Last MTM visit: Visit date not found Last visit same specialty: 9/25/2018 Anibal Staton MD.  Next visit within 3 mo: Visit date not found  Next physical within 3 mo: Visit date not found      Victoria Larose, [facility] Triage/Med Refill 5/5/2021    Requested Prescriptions   Pending Prescriptions Disp Refills     ADVAIR -21 mcg/actuation inhaler [Pharmacy Med Name: ADVAIR -21 MCG INHALER] 12 Inhaler 12     Sig: TAKE 2 PUFFS BY MOUTH TWICE A DAY       There is no refill protocol information for this order        VENTOLIN HFA 90 mcg/actuation inhaler [Pharmacy Med Name: VENTOLIN HFA 90 MCG INHALER] 18 Inhaler 0     Sig: TAKE 2 PUFFS BY MOUTH EVERY 6 HOURS AS NEEDED FOR WHEEZE       Albuterol/Levalbuterol Refill Protocol Failed - 5/5/2021  4:20 PM        Failed - PCP or prescribing provider visit in last year     Last office visit with prescriber/PCP: 9/25/2018 Anibal Staton MD OR same dept: Visit date not found OR same specialty: 9/25/2018 Anibal Staton MD Last physical: Visit date not found       Next appt within 3 mo: Visit date not found  Next physical within 3 mo: Visit date not found  Prescriber OR PCP: Anibal Staton MD  Last diagnosis associated with med order: 1. Mild persistent asthma without complication  - ADVAIR -21 mcg/actuation inhaler [Pharmacy Med Name: ADVAIR -21 MCG INHALER]; TAKE 2 PUFFS BY MOUTH TWICE A DAY  Dispense: 12 Inhaler; Refill: 12  - VENTOLIN HFA 90 mcg/actuation inhaler [Pharmacy Med Name: VENTOLIN HFA 90 MCG INHALER]; TAKE 2 PUFFS BY MOUTH EVERY 6 HOURS AS NEEDED FOR WHEEZE  Dispense: 18 Inhaler; Refill: 0    2. Anxiety state  - LORazepam (ATIVAN) 1  MG tablet [Pharmacy Med Name: LORAZEPAM 1 MG TABLET]; TAKE 1 TABLET BY MOUTH EVERY 6 HOURS AS NEEDED FOR ANXIETY. NEEDS OFFICE VISIT.  Dispense: 10 tablet; Refill: 0    If protocol passes may refill for 6 months if within 3 months of last provider visit (or a total of 9 months). If patient requesting >1 inhaler per month refill x 6 months and have patient make appointment with provider.             LORazepam (ATIVAN) 1 MG tablet [Pharmacy Med Name: LORAZEPAM 1 MG TABLET] 10 tablet 0     Sig: TAKE 1 TABLET BY MOUTH EVERY 6 HOURS AS NEEDED FOR ANXIETY. NEEDS OFFICE VISIT.       Controlled Substances Refill Protocol Failed - 5/5/2021  4:20 PM        Failed - Route all Controlled Substance Requests to Provider        Failed - Patient has controlled substance agreement in past 12 months     Encounter-Level CSA Scan Date:    There are no encounter-level csa scan date.               Failed - Visit with PCP or prescribing provider visit in past 12 months      Last office visit with prescriber/PCP: 9/25/2018 Anibal Staton MD OR same dept: Visit date not found OR same specialty: 9/25/2018 Anibal Staton MD Last physical: Visit date not found Last MTM visit: Visit date not found    Next visit within 3 mo: Visit date not found  Next physical within 3 mo: Visit date not found  Prescriber OR PCP: Anibal Staton MD  Last diagnosis associated with med order: 1. Mild persistent asthma without complication  - ADVAIR -21 mcg/actuation inhaler [Pharmacy Med Name: ADVAIR -21 MCG INHALER]; TAKE 2 PUFFS BY MOUTH TWICE A DAY  Dispense: 12 Inhaler; Refill: 12  - VENTOLIN HFA 90 mcg/actuation inhaler [Pharmacy Med Name: VENTOLIN HFA 90 MCG INHALER]; TAKE 2 PUFFS BY MOUTH EVERY 6 HOURS AS NEEDED FOR WHEEZE  Dispense: 18 Inhaler; Refill: 0    2. Anxiety state  - LORazepam (ATIVAN) 1 MG tablet [Pharmacy Med Name: LORAZEPAM 1 MG TABLET]; TAKE 1 TABLET BY MOUTH EVERY 6 HOURS AS NEEDED FOR ANXIETY. NEEDS OFFICE VISIT.   Dispense: 10 tablet; Refill: 0

## 2021-06-17 NOTE — TELEPHONE ENCOUNTER
Controlled Substance Refill Request  Medication Name:   Requested Prescriptions     Pending Prescriptions Disp Refills     ADVAIR -21 mcg/actuation inhaler [Pharmacy Med Name: ADVAIR -21 MCG INHALER] 12 Inhaler 12     Sig: TAKE 2 PUFFS BY MOUTH TWICE A DAY     VENTOLIN HFA 90 mcg/actuation inhaler [Pharmacy Med Name: VENTOLIN HFA 90 MCG INHALER] 18 Inhaler 0     Sig: TAKE 2 PUFFS BY MOUTH EVERY 6 HOURS AS NEEDED FOR WHEEZE     LORazepam (ATIVAN) 1 MG tablet [Pharmacy Med Name: LORAZEPAM 1 MG TABLET] 10 tablet 0     Sig: TAKE 1 TABLET BY MOUTH EVERY 6 HOURS AS NEEDED FOR ANXIETY. NEEDS OFFICE VISIT.     Date Last Fill: Ativan: 2/5/21  Requested Pharmacy: [facility]  Submit electronically to pharmacy  Controlled Substance Agreement on file:   Encounter-Level CSA Scan Date:    There are no encounter-level csa scan date.        Last office visit:  3/23/21         Victoria Larose RN, BSN Nurse Triage Advisor 7:34 PM 5/5/2021

## 2021-06-17 NOTE — TELEPHONE ENCOUNTER
Can we schedule him an office visit with Dr Staton? I would recommend an office visit, but if his insurance covers a fasting physical that would be an option as well. Medications have been refilled. Thank you

## 2021-06-17 NOTE — TELEPHONE ENCOUNTER
Medications have been refilled, patient has not been seen in greater than 1 year needs office visit for any additional refills.

## 2021-06-21 NOTE — PROGRESS NOTES
Patient ID: Tavon Burgos is a 37 y.o. male.  /76  Pulse 68  Wt 219 lb (99.3 kg)  SpO2 98%  BMI 31.88 kg/m2    Assessment/Plan:                   Diagnoses and all orders for this visit:    Abdominal pain, generalized  -     Celiac(Gluten)Antibody Panel  -     Thyroid Cascade    Asthma  -     fluticasone-salmeterol (ADVAIR HFA) 230-21 mcg/actuation inhaler; TAKE 2 PUFFS BY MOUTH TWICE A DAY-DUE TO BE SEEN FOR FURTHER REFILLS  Dispense: 1 Inhaler; Refill: 5    Mild persistent asthma  -     albuterol (PROAIR HFA) 90 mcg/actuation inhaler; Inhale 2 puffs every 6 (six) hours as needed for wheezing. You are over due for a visit for this medication;call 24/7  Dispense: 8.5 Inhaler; Refill: 5    Anxiety state  -     LORazepam (ATIVAN) 1 MG tablet; Take 1 tablet (1 mg total) by mouth every 6 (six) hours as needed for anxiety.  Dispense: 10 tablet; Refill: 1    GERD (gastroesophageal reflux disease)  -     omeprazole (PRILOSEC) 20 MG capsule; Take 1 capsule (20 mg total) by mouth daily.  Dispense: 90 capsule; Refill: 3    Insomnia  -     traZODone (DESYREL) 50 MG tablet; TAKE 1-2 TABLETS BY MOUTH NIGHTLY AT BEDTIME.  Dispense: 180 tablet; Refill: 0    Right shoulder pain    Right knee pain    Right foot pain    Bilateral low back pain without sciatica    Other orders  -     Influenza, Seasonal,Quad Inj, 36+ MOS          DISCUSSION  Continue current management of asthma no concern.    Cautious use of lorazepam for management of more significant anxiety.    Discussed treatment of acid reflux with omeprazole.    Treat insomnia with use of trazodone 50 mg 1-2 tablets at bedtime.    Unclear cause for abdominal pain multiple potential contributing factors including acid reflux.  Check additional labs as noted.    For multitude of joint pain.  No sign of any significant structural injury or damage.  Discussed conservative means for management of these pain symptoms.    Duration of visit exceeded 45 minutes more than  50% of time spent in counseling and coronation of care.  Subjective:     HPI    Tavon Burgos is a 37 y.o. male is here today to discuss a multitude of concerns.  He has mild persistent asthma reports no concerns.  Overall under good control.    History of anxiety has used lorazepam.  Discussed the importance of not escalating use of lorazepam.  Discussed usage in an appropriate fashion.    Reports having insomnia.  Discussed use of trazodone.  Discussed the role of medications for helping with sleep.  Discussed how medications ultimately can be very detrimental to sleep in the long-term.  Discussed sleep hygiene.  Discussed other factors that can interfere with sleep.    She reports abdominal discomfort.  Some of this may be related to pure acid reflux discussed management of that.  Other bloating concerns and discomfort associated with eating may be from other intolerance to food.  Discussed the process of trying to figure this out.  Discussed evaluation for celiac.    Brings up a multitude of various for his joint pain including right shoulder right knee right foot and his low back.  Evaluated these concerns extensively today and discussed conservative management.    Review of Systems  Complete review of systems is obtained.  Other than the specific considerations noted above complete review of systems is negative.      Objective:   Medications:  Current Outpatient Prescriptions   Medication Sig     albuterol (PROAIR HFA) 90 mcg/actuation inhaler Inhale 2 puffs every 6 (six) hours as needed for wheezing. You are over due for a visit for this medication;call 24/7     fluticasone-salmeterol (ADVAIR HFA) 230-21 mcg/actuation inhaler TAKE 2 PUFFS BY MOUTH TWICE A DAY-DUE TO BE SEEN FOR FURTHER REFILLS     LORazepam (ATIVAN) 1 MG tablet Take 1 tablet (1 mg total) by mouth every 6 (six) hours as needed for anxiety.     omeprazole (PRILOSEC) 20 MG capsule Take 1 capsule (20 mg total) by mouth daily.     traZODone  (DESYREL) 50 MG tablet TAKE 1-2 TABLETS BY MOUTH NIGHTLY AT BEDTIME.     Allergies:  No Known Allergies    Tobacco:   reports that he has never smoked. He has never used smokeless tobacco.     Physical Exam      /76  Pulse 68  Wt 219 lb (99.3 kg)  SpO2 98%  BMI 31.88 kg/m2          General Appearance:    Alert, cooperative, no distress, appears stated age   Eyes:   No scleral icterus or conjunctival irritation       Throat:   Lips, mucosa, and tongue normal; teeth and gums normal   Neck:   Supple, symmetrical, trachea midline, no adenopathy;        thyroid:  No enlargement/tenderness/nodules   Lungs:     Clear to auscultation bilaterally, respirations unlabored, no wheezes or crackles   Heart:    Regular rate and rhythm,  no murmur, rub   or gallop   Abdomen:     Soft, non-tender, bowel sounds active,     no masses, no organomegaly     Extremities:   Extremities normal, atraumatic, no cyanosis or edema   Skin:   Skin color, texture, turgor normal, no rashes or lesions   Neurologic:   Normal strength and sensation        throughout on gross examination.

## 2021-06-23 ENCOUNTER — COMMUNICATION - HEALTHEAST (OUTPATIENT)
Dept: FAMILY MEDICINE | Facility: CLINIC | Age: 41
End: 2021-06-23

## 2021-06-23 DIAGNOSIS — J45.30 MILD PERSISTENT ASTHMA WITHOUT COMPLICATION: ICD-10-CM

## 2021-06-23 RX ORDER — ALBUTEROL SULFATE 90 UG/1
AEROSOL, METERED RESPIRATORY (INHALATION)
Qty: 8.5 G | Refills: 5 | Status: SHIPPED | OUTPATIENT
Start: 2021-06-23 | End: 2022-09-06

## 2021-06-24 NOTE — TELEPHONE ENCOUNTER
Refill Approved    Rx renewed per Medication Renewal Policy. Medication was last renewed on 9/15/18.    Abena Gay, [facility] Triage/Med Refill 2/17/2019     Requested Prescriptions   Pending Prescriptions Disp Refills     traZODone (DESYREL) 50 MG tablet [Pharmacy Med Name: TRAZODONE 50 MG TABLET] 180 tablet 0     Sig: TAKE 1 TO 2 TABLETS BY MOUTH EVERY DAY AT BEDTIME    Tricyclics/Misc Antidepressant/Antianxiety Meds Refill Protocol Passed - 2/14/2019  1:24 AM       Passed - PCP or prescribing provider visit in last year    Last office visit with prescriber/PCP: 9/25/2018 Anibal Staton MD OR same dept: 9/25/2018 Anibal Staton MD OR same specialty: 9/25/2018 Anibal Staton MD  Last physical: Visit date not found Last MTM visit: Visit date not found   Next visit within 3 mo: Visit date not found  Next physical within 3 mo: Visit date not found  Prescriber OR PCP: Anibal Staton MD  Last diagnosis associated with med order: 1. Insomnia  - traZODone (DESYREL) 50 MG tablet [Pharmacy Med Name: TRAZODONE 50 MG TABLET]; TAKE 1 TO 2 TABLETS BY MOUTH EVERY DAY AT BEDTIME  Dispense: 180 tablet; Refill: 0    If protocol passes may refill for 12 months if within 3 months of last provider visit (or a total of 15 months).

## 2021-06-24 NOTE — TELEPHONE ENCOUNTER
Controlled Substance Refill Request  Medication:   Requested Prescriptions     Pending Prescriptions Disp Refills     LORazepam (ATIVAN) 1 MG tablet [Pharmacy Med Name: LORAZEPAM 1 MG TABLET] 10 tablet 1     Sig: TAKE 1 TABLET (1 MG TOTAL) BY MOUTH EVERY 6 (SIX) HOURS AS NEEDED FOR ANXIETY.     Date Last Fill: 12/31/18  [id number]   Submit electronically to pharmacy  Controlled Substance Agreement on File:   Encounter-Level CSA Scan Date:    There are no encounter-level csa scan date.       Last office visit: Last office visit pertaining to requested medication was 9/25/18.

## 2021-06-25 NOTE — TELEPHONE ENCOUNTER
RN cannot approve Refill Request    RN can NOT refill this medication PCP messaged that patient is overdue for Office Visit. Last office visit: 9/25/2018 Anibal Staton MD Last Physical: Visit date not found Last MTM visit: Visit date not found Last visit same specialty: 9/25/2018 Anibal Staton MD.  Next visit within 3 mo: Visit date not found  Next physical within 3 mo: Visit date not found      Tracey Pang, [facility] Triage/Med Refill 5/29/2021    Requested Prescriptions   Pending Prescriptions Disp Refills     VENTOLIN HFA 90 mcg/actuation inhaler [Pharmacy Med Name: VENTOLIN HFA 90 MCG INHALER] 18 Inhaler 0     Sig: INHALE 2 PUFFS BY MOUTH EVERY 6 HOURS AS NEEDED FOR WHEEZE       Albuterol/Levalbuterol Refill Protocol Failed - 5/29/2021  9:25 AM        Failed - PCP or prescribing provider visit in last year     Last office visit with prescriber/PCP: 9/25/2018 Anibal Staton MD OR same dept: Visit date not found OR same specialty: 9/25/2018 Anibal Staton MD Last physical: Visit date not found       Next appt within 3 mo: Visit date not found  Next physical within 3 mo: Visit date not found  Prescriber OR PCP: Anibal Staton MD  Last diagnosis associated with med order: 1. Mild persistent asthma without complication  - VENTOLIN HFA 90 mcg/actuation inhaler [Pharmacy Med Name: VENTOLIN HFA 90 MCG INHALER]; INHALE 2 PUFFS BY MOUTH EVERY 6 HOURS AS NEEDED FOR WHEEZE  Dispense: 18 Inhaler; Refill: 0    If protocol passes may refill for 6 months if within 3 months of last provider visit (or a total of 9 months). If patient requesting >1 inhaler per month refill x 6 months and have patient make appointment with provider.

## 2021-06-26 NOTE — TELEPHONE ENCOUNTER
Refill Approved    Rx renewed per Medication Renewal Policy. Medication was last renewed on 5/30/21.    Victoria Larose, [facility] Triage/Med Refill 6/23/2021     Requested Prescriptions   Pending Prescriptions Disp Refills     VENTOLIN HFA 90 mcg/actuation inhaler [Pharmacy Med Name: VENTOLIN HFA 90 MCG INHALER] 18 Inhaler 0     Sig: INHALE 2 PUFFS BY MOUTH EVERY 6 HOURS AS NEEDED FOR WHEEZING       Albuterol/Levalbuterol Refill Protocol Passed - 6/22/2021 12:43 AM        Passed - PCP or prescribing provider visit in last year     Last office visit with prescriber/PCP: 9/25/2018 Anibal Staton MD OR same dept: Visit date not found OR same specialty: 9/25/2018 Anibal Staton MD Last physical: 6/4/2021       Next appt within 3 mo: Visit date not found  Next physical within 3 mo: Visit date not found  Prescriber OR PCP: Anibal Staton MD  Last diagnosis associated with med order: 1. Mild persistent asthma without complication  - VENTOLIN HFA 90 mcg/actuation inhaler [Pharmacy Med Name: VENTOLIN HFA 90 MCG INHALER]; INHALE 2 PUFFS BY MOUTH EVERY 6 HOURS AS NEEDED FOR WHEEZING  Dispense: 18 Inhaler; Refill: 0    If protocol passes may refill for 6 months if within 3 months of last provider visit (or a total of 9 months). If patient requesting >1 inhaler per month refill x 6 months and have patient make appointment with provider.

## 2021-06-26 NOTE — PROGRESS NOTES
" Patient ID: Tavon Burgos is a 40 y.o. male.  /74   Pulse 75   Ht 5' 9.5\" (1.765 m)   Wt 214 lb (97.1 kg)   SpO2 98%   BMI 31.15 kg/m      Assessment/Plan:                   Diagnoses and all orders for this visit:    Routine general medical examination at a health care facility  -     Lipid Cascade FASTING  -     Glucose    Anxiety state  -     LORazepam (ATIVAN) 1 MG tablet; Take 1 tablet (1 mg total) by mouth every 6 (six) hours as needed for anxiety.  Dispense: 15 tablet; Refill: 2    Mild persistent asthma without complication    Abdominal bloating  -     Ambulatory referral to Gastroenterology - [place] [facility]    Other orders  -     Pneumococcal polysaccharide vaccine 23-valent 1 yo or older, subq/IM           DISCUSSION  Continue cautious use of lorazepam.  Continue current management of asthma.  Pneumococcal vaccine today.  Obtain labs as noted.  Referral to gastroenterology to work-up further digestive system complaints as discussed in more detail below.  Subjective:     HPI    Tavon Burgos is here today for a physical and to follow-up on chronic medical concerns.  He has underlying anxiety.  He uses lorazepam intermittently for more significant anxiety.  Uses on average about 10 tablets/month.  GUERA-7 score today is 4.  Reports overall he is managing well in this regard.  We did discussion today regarding use of lorazepam for managing anxiety.    He has mild persistent asthma.  Current asthma control test score is 23.  Patient reports some allergy symptoms have necessitated use of rescue inhaler with overall low frequency of use.  He has no concerns regarding asthma.    He has multiple musculoskeletal concerns that we have discussed previously.  He reports no significant changes.  Discussed ways of managing musculoskeletal pain.    He is concerned about abdominal bloating.  This is something we have discussed in the past.  Reports that he feels uncomfortable often after eating.  He " has been unable to identify specific food triggers.  We have tested for celiac disease and under taken additional laboratory testing including thyroid studies in the past.  No definitive cause has been found.  Discussed referral to gastroenterologist for help and further management.  He does have a history of acid reflux takes omeprazole reports no's current significant concerns.    Discussed updating Pneumovax vaccine.  Reviewed other vaccines.  No indication for prostate cancer or colon cancer screening at this time.          Review of Systems  Complete review of systems is obtained.  Other than the specific considerations noted above complete review of systems is negative.          Objective:   Medications:  Current Outpatient Medications   Medication Sig     ADVAIR -21 mcg/actuation inhaler TAKE 2 PUFFS BY MOUTH TWICE A DAY-DUE TO BE SEEN FOR FURTHER REFILLS     LORazepam (ATIVAN) 1 MG tablet Take 1 tablet (1 mg total) by mouth every 6 (six) hours as needed for anxiety.     omeprazole (PRILOSEC) 20 MG capsule TAKE 1 CAPSULE BY MOUTH EVERY DAY     traZODone (DESYREL) 50 MG tablet TAKE 1 TO 2 TABLETS BY MOUTH EVERY DAY AT BEDTIME     valACYclovir (VALTREX) 1000 MG tablet Take 2,000 mg by mouth 2 (two) times a day.     VENTOLIN HFA 90 mcg/actuation inhaler INHALE 2 PUFFS BY MOUTH EVERY 6 HOURS AS NEEDED FOR WHEEZE       Allergies:  No Known Allergies    Tobacco:   reports that he has never smoked. He has never used smokeless tobacco.        Wt Readings from Last 3 Encounters:   06/04/21 214 lb (97.1 kg)   03/23/20 212 lb (96.2 kg)   09/25/18 219 lb (99.3 kg)     Body mass index is 31.15 kg/m .    The following high BMI interventions were performed this visit: encouragement to exercise      Cholesterol:   Y  LDL Calculated (mg/dL)   Date Value   05/24/2012 150 (H)      Blood Pressure:   BP Readings from Last 3 Encounters:   06/04/21 126/74   09/25/18 126/76   06/26/17 143/90       Immunization History  "  Administered Date(s) Administered     COVID-19,PF,Pfizer 03/31/2021, 04/21/2021     DT (pediatric) 08/31/1997     Hep B, Adult 08/13/1997, 10/22/1997, 10/26/2000     Hep B, historic 08/13/1997, 10/22/1997, 10/26/2000     INFLUENZA,SEASONAL QUAD, PF, =/> 6months 11/17/2020     Influenza, inj, historic,unspecified 10/22/2008, 12/01/2019, 11/17/2020     Influenza, seasonal,quad inj 6-35 mos 12/09/2013, 10/13/2014     Influenza,seasonal, Inj IIV3 10/13/2005, 10/22/2008, 12/09/2013, 10/13/2014     Influenza,seasonal,quad inj =/> 6months 09/25/2018, 11/18/2019     Pneumo Polysac 23-V 06/04/2021     Td,adult,historic,unspecified 10/14/2008     Tdap 10/14/2008, 03/23/2014     There are no preventive care reminders to display for this patient.     Physical Exam          /74   Pulse 75   Ht 5' 9.5\" (1.765 m)   Wt 214 lb (97.1 kg)   SpO2 98%   BMI 31.15 kg/m          General Appearance:    Alert, cooperative, no distress   Eyes:   No scleral icterus or conjunctival irritation       Ears:    Normal TM's and external ear canals, both ears   Throat:   Lips, mucosa, and tongue normal; teeth and gums normal   Neck:   Supple, symmetrical, trachea midline, no adenopathy;        thyroid:  No enlargement/tenderness/nodules   Lungs:     Clear to auscultation bilaterally, respirations unlabored, no wheezes or crackles   Heart:    Regular rate and rhythm,  No murmur   Abdomen:    Soft, no distention, no tenderness on palpation, no masses, no organomegaly     Extremities:  No edema, no joint swelling or redness, no evidence of any injuries   Skin:  No concerning skin findings, no suspicious moles, no rashes   Neurologic:  On gross examination there is no motor or sensory deficit.  Patient walks with a normal gait                                   "

## 2021-06-30 ENCOUNTER — RECORDS - HEALTHEAST (OUTPATIENT)
Dept: ADMINISTRATIVE | Facility: OTHER | Age: 41
End: 2021-06-30

## 2021-07-04 NOTE — LETTER
Letter by Anibal Staton MD at      Author: Anibal Staton MD Service: -- Author Type: --    Filed:  Encounter Date: 6/6/2021 Status: (Other)         Tavon Burgos  [address] [place] [id number]             June 6, 2021         Dear Mr. Burgos,    Below are the results from your recent visit:    Resulted Orders   Lipid Collin FASTING   Result Value Ref Range    Cholesterol 236 (H) <=199 mg/dL    Triglycerides 48 <=149 mg/dL    HDL Cholesterol 53 >=40 mg/dL    LDL Calculated 173 (H) <=129 mg/dL    Patient Fasting > 8hrs? Yes    Glucose   Result Value Ref Range    Glucose 86 70 - 125 mg/dL    Patient Fasting > 8hrs? Yes     Narrative    Fasting Glucose reference range is 70-99 mg/dL per  American Diabetes Association (ADA) guidelines.       The cholesterol is high.  Compared to the last measurement done in 2012 your total cholesterol and LDL cholesterol are quite a bit higher.  If your numbers stay at this level we really should consider cholesterol-lowering medication.  I am very hopeful with buckling down on diet and getting even more exercise your numbers will improve.  I recommend that we recheck your numbers again in 6 months to be certain you are able to get them down.    The blood sugar is ideal there is no concern for diabetes.    Please call with questions or contact us using [employer].    Sincerely,        Electronically signed by Anibal Staton MD

## 2021-07-06 VITALS
HEIGHT: 70 IN | WEIGHT: 214 LBS | OXYGEN SATURATION: 98 % | BODY MASS INDEX: 30.64 KG/M2 | DIASTOLIC BLOOD PRESSURE: 74 MMHG | HEART RATE: 75 BPM | SYSTOLIC BLOOD PRESSURE: 126 MMHG

## 2021-09-01 ENCOUNTER — TRANSFERRED RECORDS (OUTPATIENT)
Dept: HEALTH INFORMATION MANAGEMENT | Facility: CLINIC | Age: 41
End: 2021-09-01

## 2021-10-16 DIAGNOSIS — K21.9 GERD (GASTROESOPHAGEAL REFLUX DISEASE): ICD-10-CM

## 2021-10-17 NOTE — TELEPHONE ENCOUNTER
"Last Written Prescription Date:  10/25/20  Last Fill Quantity: 90,  # refills: 3   Last office visit provider:  6/4/21     Requested Prescriptions   Pending Prescriptions Disp Refills     omeprazole (PRILOSEC) 20 MG DR capsule [Pharmacy Med Name: OMEPRAZOLE DR 20 MG CAPSULE] 90 capsule 3     Sig: TAKE 1 CAPSULE BY MOUTH EVERY DAY       PPI Protocol Passed - 10/16/2021  7:18 AM        Passed - Not on Clopidogrel (unless Pantoprazole ordered)        Passed - No diagnosis of osteoporosis on record        Passed - Recent (12 mo) or future (30 days) visit within the authorizing provider's specialty     Patient has had an office visit with the authorizing provider or a provider within the authorizing providers department within the previous 12 mos or has a future within next 30 days. See \"Patient Info\" tab in inbasket, or \"Choose Columns\" in Meds & Orders section of the refill encounter.              Passed - Medication is active on med list        Passed - Patient is age 18 or older             Adam Alexander RN 10/17/21 11:48 AM  "

## 2021-10-31 DIAGNOSIS — F41.1 ANXIETY STATE: ICD-10-CM

## 2021-11-01 RX ORDER — LORAZEPAM 1 MG/1
TABLET ORAL
Qty: 10 TABLET | Refills: 0 | Status: SHIPPED | OUTPATIENT
Start: 2021-11-01 | End: 2022-01-19

## 2021-11-01 NOTE — TELEPHONE ENCOUNTER
Routing refill request to provider for review/approval because:  Controlled substance request    Last Written Prescription Date:  6/4/21  Last Fill Quantity: 15,  # refills: 2   Last office visit provider:  6/4/21     Requested Prescriptions   Pending Prescriptions Disp Refills     LORazepam (ATIVAN) 1 MG tablet [Pharmacy Med Name: LORAZEPAM 1 MG TABLET] 10 tablet 0     Sig: TAKE 1 TABLET BY MOUTH EVERY 6 HOURS AS NEEDED FOR ANXIETY. NEEDS OFFICE VISIT.       There is no refill protocol information for this order          Adam Alexander RN 11/01/21 2:21 PM

## 2022-01-12 DIAGNOSIS — F41.1 ANXIETY STATE: ICD-10-CM

## 2022-01-16 NOTE — TELEPHONE ENCOUNTER
Routing refill request to provider for review/approval because:  Controlled substance request    Last Written Prescription Date:  11/1/21  Last Fill Quantity: 10,  # refills: 0   Last office visit provider:  6/4/21     Requested Prescriptions   Pending Prescriptions Disp Refills     LORazepam (ATIVAN) 1 MG tablet [Pharmacy Med Name: LORAZEPAM 1 MG TABLET] 15 tablet      Sig: TAKE 1 TABLET (1 MG TOTAL) BY MOUTH EVERY 6 (SIX) HOURS AS NEEDED FOR ANXIETY.       There is no refill protocol information for this order          Adam Alexander RN 01/16/22 7:29 AM

## 2022-01-19 RX ORDER — LORAZEPAM 1 MG/1
TABLET ORAL
Qty: 15 TABLET | Refills: 0 | Status: SHIPPED | OUTPATIENT
Start: 2022-01-19 | End: 2022-03-21

## 2022-03-08 DIAGNOSIS — J45.30 MILD PERSISTENT ASTHMA, UNCOMPLICATED: ICD-10-CM

## 2022-03-09 RX ORDER — ALBUTEROL SULFATE 90 UG/1
AEROSOL, METERED RESPIRATORY (INHALATION)
Qty: 18 G | Refills: 5 | Status: SHIPPED | OUTPATIENT
Start: 2022-03-09 | End: 2023-03-17

## 2022-03-09 NOTE — TELEPHONE ENCOUNTER
"Routing refill request to provider for review/approval because:  Patient needs to be seen because it has been more than 6 months since last office visit.  ACT    Last Written Prescription Date:  6/23/21  Last Fill Quantity: 8.5g,  # refills: 5   Last office visit provider:  6/4/21     Requested Prescriptions   Pending Prescriptions Disp Refills     VENTOLIN  (90 Base) MCG/ACT inhaler [Pharmacy Med Name: VENTOLIN HFA 90 MCG INHALER]  5     Sig: TAKE 2 PUFFS BY MOUTH EVERY 6 HOURS AS NEEDED FOR WHEEZE       Asthma Maintenance Inhalers - Anticholinergics Failed - 3/8/2022 12:31 AM        Failed - Asthma control assessment score within normal limits in last 6 months     Please review ACT score.           Failed - Recent (6 mo) or future (30 days) visit within the authorizing provider's specialty     Patient had office visit in the last 6 months or has a visit in the next 30 days with authorizing provider or within the authorizing provider's specialty.  See \"Patient Info\" tab in inbasket, or \"Choose Columns\" in Meds & Orders section of the refill encounter.            Passed - Patient is age 12 years or older        Passed - Medication is active on med list       Short-Acting Beta Agonist Inhalers Protocol  Failed - 3/8/2022 12:31 AM        Failed - Asthma control assessment score within normal limits in last 6 months     Please review ACT score.           Failed - Recent (6 mo) or future (30 days) visit within the authorizing provider's specialty     Patient had office visit in the last 6 months or has a visit in the next 30 days with authorizing provider or within the authorizing provider's specialty.  See \"Patient Info\" tab in inbasket, or \"Choose Columns\" in Meds & Orders section of the refill encounter.            Passed - Patient is age 12 or older        Passed - Medication is active on med list             Melanie Pablo RN 03/09/22 8:13 AM  "

## 2022-03-18 DIAGNOSIS — F41.1 ANXIETY STATE: ICD-10-CM

## 2022-03-20 NOTE — TELEPHONE ENCOUNTER
Routing refill request to provider for review/approval because:  Controlled Substance    Last Written Prescription Date:    LORazepam (ATIVAN) 1 MG tablet 15 tablet 0 1/19/2022  No   Sig: TAKE 1 TABLET (1 MG TOTAL) BY MOUTH EVERY 6 (SIX) HOURS AS NEEDED FOR ANXIETY.   Sent to pharmacy as: LORazepam 1 MG Oral Tablet (ATIVAN)   Class: E-Prescribe   Notes to Pharmacy: This request is for a new prescription for a controlled substance as required by Federal/State law.   Order: [id number]   E-Prescribing Status: Receipt confirmed by pharmacy (1/19/2022  5:16 PM CST)       Last office visit provider:  6/4/21     Requested Prescriptions   Pending Prescriptions Disp Refills     LORazepam (ATIVAN) 1 MG tablet [Pharmacy Med Name: LORAZEPAM 1 MG TABLET] 15 tablet 0     Sig: TAKE 1 TABLET BY MOUTH EVERY 6 HOURS AS NEEDED FOR ANXIETY.       There is no refill protocol information for this order          Lisa Galicia RN 03/20/22 1:13 PM

## 2022-03-21 RX ORDER — LORAZEPAM 1 MG/1
TABLET ORAL
Qty: 15 TABLET | Refills: 0 | Status: SHIPPED | OUTPATIENT
Start: 2022-03-21 | End: 2022-08-01

## 2022-07-31 DIAGNOSIS — G47.09 OTHER INSOMNIA: ICD-10-CM

## 2022-08-01 RX ORDER — TRAZODONE HYDROCHLORIDE 50 MG/1
TABLET, FILM COATED ORAL
Qty: 180 TABLET | Refills: 3 | Status: SHIPPED | OUTPATIENT
Start: 2022-08-01 | End: 2023-07-13

## 2022-08-01 NOTE — TELEPHONE ENCOUNTER
"Routing refill request to provider for review/approval because:  Patient needs to be seen because it has been more than 1 year since last office visit.    Last Written Prescription Date:  8/24/21  Last Fill Quantity: 180,  # refills: 3   Last office visit provider:  6/4/21     Requested Prescriptions   Pending Prescriptions Disp Refills     traZODone (DESYREL) 50 MG tablet [Pharmacy Med Name: TRAZODONE 50 MG TABLET] 180 tablet 3     Sig: TAKE 1 TO 2 TABLETS BY MOUTH EVERY DAY AT BEDTIME       Serotonin Modulators Failed - 7/31/2022  2:49 PM        Failed - Recent (12 mo) or future (30 days) visit within the authorizing provider's specialty     Patient has had an office visit with the authorizing provider or a provider within the authorizing providers department within the previous 12 mos or has a future within next 30 days. See \"Patient Info\" tab in inbasket, or \"Choose Columns\" in Meds & Orders section of the refill encounter.              Passed - Medication is active on med list        Passed - Patient is age 18 or older             Ledy Grey RN 08/01/22 2:05 PM  "

## 2022-09-06 ENCOUNTER — VIRTUAL VISIT (OUTPATIENT)
Dept: FAMILY MEDICINE | Facility: CLINIC | Age: 42
End: 2022-09-06
Payer: COMMERCIAL

## 2022-09-06 DIAGNOSIS — U07.1 INFECTION DUE TO 2019 NOVEL CORONAVIRUS: Primary | ICD-10-CM

## 2022-09-06 PROCEDURE — 99213 OFFICE O/P EST LOW 20 MIN: CPT | Mod: GT | Performed by: NURSE PRACTITIONER

## 2022-09-06 ASSESSMENT — ASTHMA QUESTIONNAIRES: ACT_TOTALSCORE: 21

## 2022-09-06 NOTE — LETTER
My Asthma Action Plan    Name: Tavon Burgos   YOB: 1980  Date: 9/6/2022   My doctor: SIENNA Prescott CNP   My clinic: [facility]        My Rescue Medicine:   Albuterol inhaler (Proair/Ventolin/Proventil HFA)  2-4 puffs EVERY 4 HOURS as needed. Use a spacer if recommended by your provider.   My Asthma Severity:   Intermittent / Exercise Induced  Know your asthma triggers: Patient is unaware of triggers             GREEN ZONE   Good Control    I feel good    No cough or wheeze    Can work, sleep and play without asthma symptoms       Take your asthma control medicine every day.     1. If exercise triggers your asthma, take your rescue medication    15 minutes before exercise or sports, and    During exercise if you have asthma symptoms  2. Spacer to use with inhaler: If you have a spacer, make sure to use it with your inhaler             YELLOW ZONE Getting Worse  I have ANY of these:    I do not feel good    Cough or wheeze    Chest feels tight    Wake up at night   1. Keep taking your Green Zone medications  2. Start taking your rescue medicine:    every 20 minutes for up to 1 hour. Then every 4 hours for 24-48 hours.  3. If you stay in the Yellow Zone for more than 12-24 hours, contact your doctor.  4. If you do not return to the Green Zone in 12-24 hours or you get worse, start taking your oral steroid medicine if prescribed by your provider.           RED ZONE Medical Alert - Get Help  I have ANY of these:    I feel awful    Medicine is not helping    Breathing getting harder    Trouble walking or talking    Nose opens wide to breathe       1. Take your rescue medicine NOW  2. If your provider has prescribed an oral steroid medicine, start taking it NOW  3. Call your doctor NOW  4. If you are still in the Red Zone after 20 minutes and you have not reached your doctor:    Take your rescue medicine again and    Call 911 or go to the emergency room right  away    See your regular doctor within 2 weeks of an Emergency Room or Urgent Care visit for follow-up treatment.          Annual Reminders:  Meet with Asthma Educator,  Flu Shot in the Fall, consider Pneumonia Vaccination for patients with asthma (aged 19 and older).    Pharmacy: [facility] [id number] [place] [address]    Electronically signed by SIENNA Prescott CNP   Date: 09/06/22                    Asthma Triggers  How To Control Things That Make Your Asthma Worse    Triggers are things that make your asthma worse.  Look at the list below to help you find your triggers and   what you can do about them. You can help prevent asthma flare-ups by staying away from your triggers.      Trigger                                                          What you can do   Cigarette Smoke  Tobacco smoke can make asthma worse. Do not allow smoking in your home, car or around you.  Be sure no one smokes at a child s day care or school.  If you smoke, ask your health care provider for ways to help you quit.  Ask family members to quit too.  Ask your health care provider for a referral to Quit Plan to help you quit smoking, or call 1-612-851-PLAN.     Colds, Flu, Bronchitis  These are common triggers of asthma. Wash your hands often.  Don t touch your eyes, nose or mouth.  Get a flu shot every year.     Dust Mites  These are tiny bugs that live in cloth or carpet. They are too small to see. Wash sheets and blankets in hot water every week.   Encase pillows and mattress in dust mite proof covers.  Avoid having carpet if you can. If you have carpet, vacuum weekly.   Use a dust mask and HEPA vacuum.   Pollen and Outdoor Mold  Some people are allergic to trees, grass, or weed pollen, or molds. Try to keep your windows closed.  Limit time out doors when pollen count is high.   Ask you health care provider about taking medicine during allergy season.     Animal Dander  Some people are allergic to skin flakes,  urine or saliva from pets with fur or feathers. Keep pets with fur or feathers out of your home.    If you can t keep the pet outdoors, then keep the pet out of your bedroom.  Keep the bedroom door closed.  Keep pets off cloth furniture and away from stuffed toys.     Mice, Rats, and Cockroaches  Some people are allergic to the waste from these pests.   Cover food and garbage.  Clean up spills and food crumbs.  Store grease in the refrigerator.   Keep food out of the bedroom.   Indoor Mold  This can be a trigger if your home has high moisture. Fix leaking faucets, pipes, or other sources of water.   Clean moldy surfaces.  Dehumidify basement if it is damp and smelly.   Smoke, Strong Odors, and Sprays  These can reduce air quality. Stay away from strong odors and sprays, such as perfume, powder, hair spray, paints, smoke incense, paint, cleaning products, candles and new carpet.   Exercise or Sports  Some people with asthma have this trigger. Be active!  Ask your doctor about taking medicine before sports or exercise to prevent symptoms.    Warm up for 5-10 minutes before and after sports or exercise.     Other Triggers of Asthma  Cold air:  Cover your nose and mouth with a scarf.  Sometimes laughing or crying can be a trigger.  Some medicines and food can trigger asthma.

## 2022-09-06 NOTE — PROGRESS NOTES
Tavon is a 41 year old who is being evaluated via a billable video visit.      How would you like to obtain your AVS? Mail a copy  If the video visit is dropped, the invitation should be resent by: Text to cell phone: [phone]  Will anyone else be joining your video visit? No      Assessment & Plan     (U07.1) Infection due to 2019 novel coronavirus  (primary encounter diagnosis)  Comment:   Plan: molnupiravir (LAGEVRIO) 200 MG capsule            No follow-ups on file.    Sara Pryor, SIENNA CNP  [facility]    Subjective   Tavon is a 41 year old, presenting for the following health issues:  Covid Treatment      HPI       COVID-19 Symptom Review  How many days ago did these symptoms start? 3 days, tested positive yesterday     Are any of the following symptoms significant for you?  New or worsening difficulty breathing? Yes    Please describe what kind of difficulty you are having breathing:Mild dyspnea (able to do ADLs without difficulty, mild shortness of breath with activities such as climbing one or two flights of stairs or walking briskly)    Worsening cough? No    Fever or chills? Yes, I felt feverish or had chills.    Headache: No    Sore throat: YES    Chest pain: YES- tightness    Diarrhea: YES    Body aches? YES    What treatments has patient tried? Ibuprofen, mucinex, tylenol   Does patient live in a nursing home, group home, or shelter? No  Does patient have a way to get food/medications during quarantined? Yes, I have a friend or family member who can help me.        Review of Systems   Constitutional, HEENT, cardiovascular, pulmonary, gi and gu systems are negative, except as otherwise noted.      Objective           Vitals:  No vitals were obtained today due to virtual visit.    Physical Exam   GENERAL: Healthy, alert and no distress  EYES: Eyes grossly normal to inspection.  No discharge or erythema, or obvious scleral/conjunctival abnormalities.  RESP: No audible  wheeze, cough, or visible cyanosis.  No visible retractions or increased work of breathing.    SKIN: Visible skin clear. No significant rash, abnormal pigmentation or lesions.  NEURO: Cranial nerves grossly intact.  Mentation and speech appropriate for age.  PSYCH: Mentation appears normal, affect normal/bright, judgement and insight intact, normal speech and appearance well-groomed.    No results found for any visits on 09/06/22.            Video-Visit Details    Video Start Time: 4:05    Type of service:  Video Visit    Video End Time:4:16    Originating Location (pt. Location): Home    Distant Location (provider location):  [facility]     Platform used for Video Visit: Natalia    [unfilled]  @[email or web address] [email or web address]@

## 2022-10-02 DIAGNOSIS — J45.909 ASTHMA: ICD-10-CM

## 2022-10-03 RX ORDER — FLUTICASONE PROPIONATE AND SALMETEROL XINAFOATE 230; 21 UG/1; UG/1
AEROSOL, METERED RESPIRATORY (INHALATION)
Qty: 12 G | Refills: 0 | Status: SHIPPED | OUTPATIENT
Start: 2022-10-03 | End: 2022-11-06

## 2022-10-03 NOTE — TELEPHONE ENCOUNTER
"Routing refill request to provider for review/approval because:  Patient needs to be seen because it has been more than 1 year since last office visit.    Last Written Prescription Date:  8/1/22  Last Fill Quantity: 12 g,  # refills: 1   Last office visit provider:  6/4/21     Requested Prescriptions   Pending Prescriptions Disp Refills     ADVAIR -21 MCG/ACT inhaler [Pharmacy Med Name: ADVAIR -21 MCG INHALER]  1     Sig: TAKE 2 PUFFS BY MOUTH TWICE A DAY       Inhaled Steroids Protocol Failed - 10/3/2022  9:12 AM        Failed - Recent (6 mo) or future (30 days) visit within the authorizing provider's specialty     Patient had office visit in the last 6 months or has a visit in the next 30 days with authorizing provider or within the authorizing provider's specialty.  See \"Patient Info\" tab in inbasket, or \"Choose Columns\" in Meds & Orders section of the refill encounter.            Passed - Patient is age 12 or older        Passed - Asthma control assessment score within normal limits in last 6 months     Please review ACT score.           Passed - Medication is active on med list       Long-Acting Beta Agonist Inhalers Protocol  Failed - 10/3/2022  9:12 AM        Failed - Recent (6 mo) or future (30 days) visit within the authorizing provider's specialty     Patient had office visit in the last 6 months or has a visit in the next 30 days with authorizing provider or within the authorizing provider's specialty.  See \"Patient Info\" tab in inbasket, or \"Choose Columns\" in Meds & Orders section of the refill encounter.            Passed - Patient is age 12 or older        Passed - Asthma control assessment score within normal limits in last 6 months     Please review ACT score.           Passed - Medication is active on med list             Adam Alexander RN 10/03/22 9:12 AM  "

## 2023-01-06 DIAGNOSIS — F41.1 ANXIETY STATE: ICD-10-CM

## 2023-01-06 DIAGNOSIS — F41.8 SITUATIONAL ANXIETY: Primary | ICD-10-CM

## 2023-01-06 RX ORDER — LORAZEPAM 1 MG/1
TABLET ORAL
Qty: 5 TABLET | Refills: 0 | Status: SHIPPED | OUTPATIENT
Start: 2023-01-06 | End: 2023-04-05

## 2023-01-29 DIAGNOSIS — K21.9 GERD (GASTROESOPHAGEAL REFLUX DISEASE): ICD-10-CM

## 2023-01-30 NOTE — TELEPHONE ENCOUNTER
"Routing refill request to provider for review/approval because:  Patient needs to be seen because it has been more than 1 year since last office visit.    Last Written Prescription Date:  11/1/22  Last Fill Quantity: 90,  # refills: 0   Last office visit provider:  6/4/21     Requested Prescriptions   Pending Prescriptions Disp Refills     omeprazole (PRILOSEC) 20 MG DR capsule [Pharmacy Med Name: OMEPRAZOLE DR 20 MG CAPSULE] 90 capsule 0     Sig: TAKE 1 CAPSULE BY MOUTH EVERY DAY       PPI Protocol Passed - 1/30/2023 10:48 AM        Passed - Not on Clopidogrel (unless Pantoprazole ordered)        Passed - No diagnosis of osteoporosis on record        Passed - Recent (12 mo) or future (30 days) visit within the authorizing provider's specialty     Patient has had an office visit with the authorizing provider or a provider within the authorizing providers department within the previous 12 mos or has a future within next 30 days. See \"Patient Info\" tab in inbasket, or \"Choose Columns\" in Meds & Orders section of the refill encounter.              Passed - Medication is active on med list        Passed - Patient is age 18 or older             Adam Alexander RN 01/30/23 10:48 AM  "

## 2023-04-05 ENCOUNTER — NURSE TRIAGE (OUTPATIENT)
Dept: NURSING | Facility: CLINIC | Age: 43
End: 2023-04-05

## 2023-04-05 DIAGNOSIS — F41.1 ANXIETY STATE: ICD-10-CM

## 2023-04-05 DIAGNOSIS — F41.8 SITUATIONAL ANXIETY: ICD-10-CM

## 2023-04-05 RX ORDER — LORAZEPAM 1 MG/1
1 TABLET ORAL EVERY 6 HOURS PRN
Qty: 10 TABLET | Refills: 0 | Status: SHIPPED | OUTPATIENT
Start: 2023-04-05 | End: 2023-06-07

## 2023-04-05 NOTE — TELEPHONE ENCOUNTER
Nurse Triage SBAR    Is this a 2nd Level Triage? YES, LICENSED PRACTITIONER REVIEW IS REQUIRED    Situation: Patient calling a second time for refill of controlled substance.    Background: Patient calling for refill of Ativan. Patient states he was getting 10 pills per fill before, now only 5. Asked if this can be changed back to 10. Medication already pended for Dr. Staton.    Assessment: Patient with refill request.    Protocol Recommended Disposition:   Discuss With PCP And Callback By Nurse Today    Recommendation: Advised patient that message would be sent to care team regarding patient request. Patient advised of refill policy.     Routed to provider    Does the patient meet one of the following criteria for ADS visit consideration? 16+ years old, with an [facility] PCP     TIP  Providers, please consider if this condition is appropriate for management at one of our Acute and Diagnostic Services sites.     If patient is a good candidate, please use dotphrase <dot>triageresponse and select Refer to ADS to document.     Reason for Disposition    Caller requesting a CONTROLLED substance prescription refill (e.g., narcotics, ADHD medicines)    Additional Information    Negative: New-onset or worsening symptoms, see that protocol (e.g., diarrhea, runny nose, sore throat)    Negative: Medicine question not related to refill or renewal    Negative: Caller (e.g., patient or pharmacist) requesting information about a new medicine    Negative: Caller requesting information unrelated to medicine    Negative: Prescription refill request for ESSENTIAL medicine (i.e., likelihood of harm to patient if not taken) and triager unable to refill per department policy    Negative: Prescription not at pharmacy and was prescribed by PCP recently  (Exception: triager has access to EMR and prescription is recorded there. Go to Home Care and confirm for pharmacy.)    Negative: Pharmacy calling with prescription questions and triager unable  to answer question    Protocols used: MEDICATION REFILL AND RENEWAL CALL-A-OH    Adam Alexander, RN, BSN, MSN  [facility] Triage 4:03 PM

## 2023-04-05 NOTE — TELEPHONE ENCOUNTER
Provider Response to 2nd Level Triage Request    I have reviewed the RN documentation. My recommendation is:   I will place a refill for lorazepam 1 mg as directed #10 without refill

## 2023-04-27 ENCOUNTER — TELEPHONE (OUTPATIENT)
Dept: FAMILY MEDICINE | Facility: CLINIC | Age: 43
End: 2023-04-27

## 2023-04-27 NOTE — TELEPHONE ENCOUNTER
Patient has not seen Dr Staton in about 2 years.   Overdue for a medication check.  I would recommend a physical.    A fasting in the physical in the morning would be ideal,  If he doesn't want a physical we could do a med check.    40 minutes length, in-person for either.    No urgency. Sometime in May/June is fine.    Thank you

## 2023-06-06 ENCOUNTER — OFFICE VISIT (OUTPATIENT)
Dept: FAMILY MEDICINE | Facility: CLINIC | Age: 43
End: 2023-06-06
Payer: COMMERCIAL

## 2023-06-06 VITALS
TEMPERATURE: 98.6 F | BODY MASS INDEX: 31.47 KG/M2 | OXYGEN SATURATION: 98 % | HEART RATE: 75 BPM | HEIGHT: 70 IN | DIASTOLIC BLOOD PRESSURE: 76 MMHG | SYSTOLIC BLOOD PRESSURE: 126 MMHG | RESPIRATION RATE: 18 BRPM | WEIGHT: 219.8 LBS

## 2023-06-06 DIAGNOSIS — Z56.6 STRESSFUL JOB: ICD-10-CM

## 2023-06-06 DIAGNOSIS — R53.83 OTHER FATIGUE: Primary | ICD-10-CM

## 2023-06-06 DIAGNOSIS — F41.9 ANXIETY: ICD-10-CM

## 2023-06-06 DIAGNOSIS — J30.9 ALLERGIC RHINITIS, UNSPECIFIED SEASONALITY, UNSPECIFIED TRIGGER: ICD-10-CM

## 2023-06-06 DIAGNOSIS — F98.8 ATTENTION DEFICIT DISORDER (ADD) WITHOUT HYPERACTIVITY: ICD-10-CM

## 2023-06-06 DIAGNOSIS — R06.83 SNORING: ICD-10-CM

## 2023-06-06 DIAGNOSIS — J45.30 MILD PERSISTENT ASTHMA, UNCOMPLICATED: ICD-10-CM

## 2023-06-06 DIAGNOSIS — G47.00 INSOMNIA, UNSPECIFIED TYPE: ICD-10-CM

## 2023-06-06 LAB
BASOPHILS # BLD AUTO: 0 10E3/UL (ref 0–0.2)
BASOPHILS NFR BLD AUTO: 1 %
EOSINOPHIL # BLD AUTO: 0.5 10E3/UL (ref 0–0.7)
EOSINOPHIL NFR BLD AUTO: 6 %
ERYTHROCYTE [DISTWIDTH] IN BLOOD BY AUTOMATED COUNT: 12.2 % (ref 10–15)
HCT VFR BLD AUTO: 42.2 % (ref 40–53)
HGB BLD-MCNC: 15.3 G/DL (ref 13.3–17.7)
IMM GRANULOCYTES # BLD: 0 10E3/UL
IMM GRANULOCYTES NFR BLD: 0 %
LYMPHOCYTES # BLD AUTO: 2.6 10E3/UL (ref 0.8–5.3)
LYMPHOCYTES NFR BLD AUTO: 34 %
MCH RBC QN AUTO: 30.7 PG (ref 26.5–33)
MCHC RBC AUTO-ENTMCNC: 36.3 G/DL (ref 31.5–36.5)
MCV RBC AUTO: 85 FL (ref 78–100)
MONOCYTES # BLD AUTO: 0.9 10E3/UL (ref 0–1.3)
MONOCYTES NFR BLD AUTO: 11 %
NEUTROPHILS # BLD AUTO: 3.7 10E3/UL (ref 1.6–8.3)
NEUTROPHILS NFR BLD AUTO: 48 %
PLATELET # BLD AUTO: 438 10E3/UL (ref 150–450)
RBC # BLD AUTO: 4.98 10E6/UL (ref 4.4–5.9)
WBC # BLD AUTO: 7.7 10E3/UL (ref 4–11)

## 2023-06-06 PROCEDURE — 84270 ASSAY OF SEX HORMONE GLOBUL: CPT | Performed by: FAMILY MEDICINE

## 2023-06-06 PROCEDURE — 99214 OFFICE O/P EST MOD 30 MIN: CPT | Performed by: FAMILY MEDICINE

## 2023-06-06 PROCEDURE — 80050 GENERAL HEALTH PANEL: CPT | Performed by: FAMILY MEDICINE

## 2023-06-06 PROCEDURE — 84403 ASSAY OF TOTAL TESTOSTERONE: CPT | Performed by: FAMILY MEDICINE

## 2023-06-06 PROCEDURE — 36415 COLL VENOUS BLD VENIPUNCTURE: CPT | Performed by: FAMILY MEDICINE

## 2023-06-06 SDOH — HEALTH STABILITY - MENTAL HEALTH: OTHER PHYSICAL AND MENTAL STRAIN RELATED TO WORK: Z56.6

## 2023-06-06 ASSESSMENT — ENCOUNTER SYMPTOMS
MYALGIAS: 0
COUGH: 0
DIARRHEA: 0
PALPITATIONS: 0
EYE PAIN: 0
NAUSEA: 0
HEADACHES: 0
JOINT SWELLING: 0
SORE THROAT: 0
NERVOUS/ANXIOUS: 0
HEMATOCHEZIA: 0
DYSURIA: 0
ABDOMINAL PAIN: 0
WEAKNESS: 0
HEMATURIA: 0
HEARTBURN: 0
ARTHRALGIAS: 1
PARESTHESIAS: 0
CHILLS: 0
FEVER: 0
FREQUENCY: 0
CONSTIPATION: 0
DIZZINESS: 0
SHORTNESS OF BREATH: 0

## 2023-06-06 ASSESSMENT — ASTHMA QUESTIONNAIRES
QUESTION_1 LAST FOUR WEEKS HOW MUCH OF THE TIME DID YOUR ASTHMA KEEP YOU FROM GETTING AS MUCH DONE AT WORK, SCHOOL OR AT HOME: NONE OF THE TIME
QUESTION_5 LAST FOUR WEEKS HOW WOULD YOU RATE YOUR ASTHMA CONTROL: COMPLETELY CONTROLLED
QUESTION_1 LAST FOUR WEEKS HOW MUCH OF THE TIME DID YOUR ASTHMA KEEP YOU FROM GETTING AS MUCH DONE AT WORK, SCHOOL OR AT HOME: NONE OF THE TIME
QUESTION_2 LAST FOUR WEEKS HOW OFTEN HAVE YOU HAD SHORTNESS OF BREATH: NOT AT ALL
QUESTION_4 LAST FOUR WEEKS HOW OFTEN HAVE YOU USED YOUR RESCUE INHALER OR NEBULIZER MEDICATION (SUCH AS ALBUTEROL): NOT AT ALL
QUESTION_3 LAST FOUR WEEKS HOW OFTEN DID YOUR ASTHMA SYMPTOMS (WHEEZING, COUGHING, SHORTNESS OF BREATH, CHEST TIGHTNESS OR PAIN) WAKE YOU UP AT NIGHT OR EARLIER THAN USUAL IN THE MORNING: NOT AT ALL
QUESTION_4 LAST FOUR WEEKS HOW OFTEN HAVE YOU USED YOUR RESCUE INHALER OR NEBULIZER MEDICATION (SUCH AS ALBUTEROL): ONE OR TWO TIMES PER DAY
QUESTION_2 LAST FOUR WEEKS HOW OFTEN HAVE YOU HAD SHORTNESS OF BREATH: NOT AT ALL
QUESTION_5 LAST FOUR WEEKS HOW WOULD YOU RATE YOUR ASTHMA CONTROL: WELL CONTROLLED
ACT_TOTALSCORE: 21
QUESTION_3 LAST FOUR WEEKS HOW OFTEN DID YOUR ASTHMA SYMPTOMS (WHEEZING, COUGHING, SHORTNESS OF BREATH, CHEST TIGHTNESS OR PAIN) WAKE YOU UP AT NIGHT OR EARLIER THAN USUAL IN THE MORNING: NOT AT ALL
ACT_TOTALSCORE: 21
ACT_TOTALSCORE: 25

## 2023-06-06 ASSESSMENT — ANXIETY QUESTIONNAIRES
8. IF YOU CHECKED OFF ANY PROBLEMS, HOW DIFFICULT HAVE THESE MADE IT FOR YOU TO DO YOUR WORK, TAKE CARE OF THINGS AT HOME, OR GET ALONG WITH OTHER PEOPLE?: SOMEWHAT DIFFICULT
GAD7 TOTAL SCORE: 4
4. TROUBLE RELAXING: SEVERAL DAYS
1. FEELING NERVOUS, ANXIOUS, OR ON EDGE: SEVERAL DAYS
IF YOU CHECKED OFF ANY PROBLEMS ON THIS QUESTIONNAIRE, HOW DIFFICULT HAVE THESE PROBLEMS MADE IT FOR YOU TO DO YOUR WORK, TAKE CARE OF THINGS AT HOME, OR GET ALONG WITH OTHER PEOPLE: SOMEWHAT DIFFICULT
6. BECOMING EASILY ANNOYED OR IRRITABLE: NOT AT ALL
2. NOT BEING ABLE TO STOP OR CONTROL WORRYING: SEVERAL DAYS
7. FEELING AFRAID AS IF SOMETHING AWFUL MIGHT HAPPEN: NOT AT ALL
3. WORRYING TOO MUCH ABOUT DIFFERENT THINGS: SEVERAL DAYS
GAD7 TOTAL SCORE: 4
GAD7 TOTAL SCORE: 4
7. FEELING AFRAID AS IF SOMETHING AWFUL MIGHT HAPPEN: NOT AT ALL
5. BEING SO RESTLESS THAT IT IS HARD TO SIT STILL: NOT AT ALL

## 2023-06-06 ASSESSMENT — PAIN SCALES - GENERAL: PAINLEVEL: NO PAIN (0)

## 2023-06-06 ASSESSMENT — PATIENT HEALTH QUESTIONNAIRE - PHQ9
SUM OF ALL RESPONSES TO PHQ QUESTIONS 1-9: 4
SUM OF ALL RESPONSES TO PHQ QUESTIONS 1-9: 4
10. IF YOU CHECKED OFF ANY PROBLEMS, HOW DIFFICULT HAVE THESE PROBLEMS MADE IT FOR YOU TO DO YOUR WORK, TAKE CARE OF THINGS AT HOME, OR GET ALONG WITH OTHER PEOPLE: SOMEWHAT DIFFICULT

## 2023-06-06 NOTE — PROGRESS NOTES
"Tavon Burgos  /76   Pulse 75   Temp 98.6  F (37  C)   Resp 18   Ht 1.765 m (5' 9.5\")   Wt 99.7 kg (219 lb 12.8 oz)   SpO2 98%   BMI 31.99 kg/m       Assessment/Plan:                Tavon was seen today for recheck medication and physical.    Diagnoses and all orders for this visit:    Other fatigue  -     Comprehensive metabolic panel; Future  -     TSH with free T4 reflex; Future  -     Testosterone Free and Total; Future  -     CBC with Platelets & Differential; Future  -     Adult Sleep Eval & Management  Referral; Future    Mild persistent asthma, uncomplicated    Anxiety    Insomnia, unspecified type    Snoring  -     Adult Sleep Eval & Management  Referral; Future    Allergic rhinitis, unspecified seasonality, unspecified trigger    Attention deficit disorder (ADD) without hyperactivity    Stressful job         DISCUSSION  See discussion above.  Obtain labs as above referral to sleep specialist.  Consider short-term follow-up visit to discuss concerns further pending test results.  Subjective:     HPI:    Tavon Burgos is a 42 year old male is here today for routine health prevention and to discuss several ongoing concerns.    He has mild persistent asthma.  He is on Advair as a controller medication.  Often takes just once daily which usually works to keep things controlled.  More recently seems to be having some issues possibly with seasonal allergies.  Discussed increasing use of controller medication to twice daily.  Asthma control test score is 25.    He has anxiety.  He has intermittent anxiety and some specific anxieties such as with flying and travel.  Utilizes lorazepam intermittently for help with alleviating acute anxiety.  In the past had been on daily medication but did not like side effects.  Patient does acknowledge and have a very stressful job.  This often seems to be an interruption to sleep.  He does take trazodone for sleep which allows him to get " to sleep but he often wakes up.  Reviewed sleep hygiene.  On further questioning he does snore.  He does often wake feeling not well rested.  There are multiple confounding factors but felt that based on his chronicity of symptoms and other factors he would benefit from a more formal sleep evaluation.  We also discussed obtaining some lab test today to rule out other potential causes of his fatigue.    He has a past history of attention deficit disorder that is essentially lifelong.  He recalls a trial of medication as an adult that made him feel too revved up and he did not like it particularly.  He wonders about other potential medication therapy.  We elected to defer further conversation of this for the time being as we work-up other concerns specially sleep.  It may be reasonable to consider a retrial of medication therapy at some point in time.    He has chronic right shoulder pain, right knee pain and left wrist pain.  Does not feel any of these concerns are bad enough at this point to warrant taking further action.    He does have a family history of melanoma in his father.  Has seen a dermatologist and had a skin removal of a noncancerous lesion from the right leg.  Patient is encouraged to follow with dermatology per their recommendation, I recommend every 1 to 2 years.    ROS:  Complete review of systems is obtained.  Other than the specific considerations noted above complete review of systems is negative.          Objective:   Medications:  Current Outpatient Medications   Medication     ADVAIR -21 MCG/ACT inhaler     LORazepam (ATIVAN) 1 MG tablet     omeprazole (PRILOSEC) 20 MG DR capsule     traZODone (DESYREL) 50 MG tablet     valACYclovir (VALTREX) 1000 MG tablet     VENTOLIN  (90 Base) MCG/ACT inhaler     No current facility-administered medications for this visit.        Allergies:   No Known Allergies     Social History     Socioeconomic History     Marital status:       "Spouse name: Not on file     Number of children: Not on file     Years of education: Not on file     Highest education level: Not on file   Occupational History     Not on file   Tobacco Use     Smoking status: Never     Smokeless tobacco: Never   Vaping Use     Vaping status: Never Used   Substance and Sexual Activity     Alcohol use: Never     Drug use: Never     Sexual activity: Not on file   Other Topics Concern     Not on file   Social History Narrative     Not on file     Social Determinants of Health     Financial Resource Strain: Not on file   Food Insecurity: Not on file   Transportation Needs: Not on file   Physical Activity: Not on file   Stress: Not on file   Social Connections: Not on file   Intimate Partner Violence: Not on file   Housing Stability: Not on file       No family history on file.     Most Recent Immunizations   Administered Date(s) Administered     COVID-19 MONOVALENT 12+ (Pfizer) 11/19/2021     DT (PEDS <7y) 08/31/1997     Flu, Unspecified 11/17/2020     HepB, Unspecified 10/26/2000     Hepatitis B, Adult 10/26/2000     Influenza (IIV3) PF 10/13/2014     Influenza Vaccine >6 months (Alfuria,Fluzone) 11/08/2022     Influenza Vaccine, 6+MO IM (QUADRIVALENT W/PRESERVATIVES) 11/18/2019     Influenza,INJ,MDCK,PF,Quad >6mo(Flucelvax) 12/26/2017     Pneumococcal 23 valent 06/04/2021     TDAP (Adacel,Boostrix) 03/23/2014     Td,adult,historic,unspecified 10/14/2008        Wt Readings from Last 3 Encounters:   06/06/23 99.7 kg (219 lb 12.8 oz)   06/04/21 97.1 kg (214 lb)   03/23/20 96.2 kg (212 lb)        BP Readings from Last 6 Encounters:   06/06/23 126/76   06/04/21 126/74          PHYSICAL EXAM:    /76   Pulse 75   Temp 98.6  F (37  C)   Resp 18   Ht 1.765 m (5' 9.5\")   Wt 99.7 kg (219 lb 12.8 oz)   SpO2 98%   BMI 31.99 kg/m           General Appearance:    Alert, cooperative, no distress   Eyes:   No scleral icterus or conjunctival irritation       Ears:    Normal TM's and " external ear canals, both ears   Throat:   Lips, mucosa, and tongue normal; teeth and gums normal   Neck:   Supple, symmetrical, trachea midline, no adenopathy;        thyroid:  No enlargement/tenderness/nodules   Lungs:     Clear to auscultation bilaterally, respirations unlabored, no wheezes or crackles   Heart:    Regular rate and rhythm,  No murmur   Abdomen:    Soft, no distention, no tenderness on palpation, no masses, no organomegaly     Extremities:  No edema, no joint swelling or redness, no evidence of any injuries   Skin:  No concerning skin findings, no suspicious moles, no rashes   Neurologic:  On gross examination there is no motor or sensory deficit.  Patient walks with a normal gait                                       Answers for HPI/ROS submitted by the patient on 6/6/2023  If you checked off any problems, how difficult have these problems made it for you to do your work, take care of things at home, or get along with other people?: Somewhat difficult  PHQ9 TOTAL SCORE: 4  GUERA 7 TOTAL SCORE: 4  Frequency of exercise:: 2-3 days/week  Getting at least 3 servings of Calcium per day:: Yes  Diet:: Regular (no restrictions)  Taking medications regularly:: Yes  Medication side effects:: None  Bi-annual eye exam:: NO  Dental care twice a year:: NO  Sleep apnea or symptoms of sleep apnea:: Daytime drowsiness  abdominal pain: No  Blood in stool: No  Blood in urine: No  chest pain: No  chills: No  congestion: Yes  constipation: No  cough: No  diarrhea: No  dizziness: No  ear pain: No  eye pain: No  nervous/anxious: No  fever: No  frequency: No  genital sores: No  headaches: No  hearing loss: No  heartburn: No  arthralgias: Yes  joint swelling: No  peripheral edema: No  mood changes: No  myalgias: No  nausea: No  dysuria: No  palpitations: No  Skin sensation changes: No  sore throat: No  urgency: No  rash: No  shortness of breath: No  visual disturbance: No  weakness: No  impotence: No  penile discharge:  No  Additional concerns today:: No  Duration of exercise:: 15-30 minutes

## 2023-06-07 ENCOUNTER — TELEPHONE (OUTPATIENT)
Dept: FAMILY MEDICINE | Facility: CLINIC | Age: 43
End: 2023-06-07
Payer: COMMERCIAL

## 2023-06-07 DIAGNOSIS — F41.1 ANXIETY STATE: ICD-10-CM

## 2023-06-07 DIAGNOSIS — F41.8 SITUATIONAL ANXIETY: ICD-10-CM

## 2023-06-07 LAB
ALBUMIN SERPL BCG-MCNC: 4.4 G/DL (ref 3.5–5.2)
ALP SERPL-CCNC: 75 U/L (ref 40–129)
ALT SERPL W P-5'-P-CCNC: 31 U/L (ref 10–50)
ANION GAP SERPL CALCULATED.3IONS-SCNC: 14 MMOL/L (ref 7–15)
AST SERPL W P-5'-P-CCNC: 29 U/L (ref 10–50)
BILIRUB SERPL-MCNC: 0.5 MG/DL
BUN SERPL-MCNC: 15 MG/DL (ref 6–20)
CALCIUM SERPL-MCNC: 9.5 MG/DL (ref 8.6–10)
CHLORIDE SERPL-SCNC: 103 MMOL/L (ref 98–107)
CREAT SERPL-MCNC: 1.06 MG/DL (ref 0.67–1.17)
DEPRECATED HCO3 PLAS-SCNC: 20 MMOL/L (ref 22–29)
GFR SERPL CREATININE-BSD FRML MDRD: >90 ML/MIN/1.73M2
GLUCOSE SERPL-MCNC: 108 MG/DL (ref 70–99)
POTASSIUM SERPL-SCNC: 4.5 MMOL/L (ref 3.4–5.3)
PROT SERPL-MCNC: 8 G/DL (ref 6.4–8.3)
SHBG SERPL-SCNC: 51 NMOL/L (ref 11–80)
SODIUM SERPL-SCNC: 137 MMOL/L (ref 136–145)
TSH SERPL DL<=0.005 MIU/L-ACNC: 0.92 UIU/ML (ref 0.3–4.2)

## 2023-06-07 RX ORDER — LORAZEPAM 1 MG/1
1 TABLET ORAL EVERY 6 HOURS PRN
Qty: 10 TABLET | Refills: 0 | Status: SHIPPED | OUTPATIENT
Start: 2023-06-07 | End: 2023-09-06

## 2023-06-07 NOTE — TELEPHONE ENCOUNTER
Medication Question or Refill    Contacts       Type Contact Phone/Fax    06/07/2023 11:37 AM CDT Phone (Incoming) Tavon Burgos (Self) [phone] (H)          What medication are you calling about (include dose and sig)?: Lorazepam 1MG please put 10 in the bottle     Preferred Pharmacy:[facility] [address] [facility] [employer] [facility] [address] [facility]  [address]  [facility] [address]  Phone: [phone] Fax: [phone]      Controlled Substance Agreement on file:   CSA -- Patient Level:    CSA: None found at the patient level.       Who prescribed the medication?: PCP    Do you need a refill? Yes    When did you use the medication last? Week and the half    Patient offered an appointment? No    Do you have any questions or concerns?  No      Okay to leave a detailed message?: Yes at Cell number on file:    Telephone Information:   Mobile [phone]

## 2023-06-08 LAB
TESTOST FREE SERPL-MCNC: 9.2 NG/DL
TESTOST SERPL-MCNC: 571 NG/DL (ref 240–950)

## 2023-06-28 DIAGNOSIS — J45.30 MILD PERSISTENT ASTHMA, UNCOMPLICATED: ICD-10-CM

## 2023-06-28 NOTE — TELEPHONE ENCOUNTER
"Last Written Prescription Date:  6/2/23  Last Fill Quantity: 18 g,  # refills: 0   Last office visit provider:  6/6/23     Requested Prescriptions   Pending Prescriptions Disp Refills     VENTOLIN  (90 Base) MCG/ACT inhaler [Pharmacy Med Name: VENTOLIN HFA 90 MCG INHALER]       Sig: TAKE 2 PUFFS BY MOUTH EVERY 6 HOURS AS NEEDED FOR WHEEZE       Asthma Maintenance Inhalers - Anticholinergics Passed - 6/28/2023  3:32 PM        Passed - Patient is age 12 years or older        Passed - Asthma control assessment score within normal limits in last 6 months     Please review ACT score.           Passed - Medication is active on med list        Passed - Recent (6 mo) or future (30 days) visit within the authorizing provider's specialty     Patient had office visit in the last 6 months or has a visit in the next 30 days with authorizing provider or within the authorizing provider's specialty.  See \"Patient Info\" tab in inbasket, or \"Choose Columns\" in Meds & Orders section of the refill encounter.           Short-Acting Beta Agonist Inhalers Protocol  Passed - 6/28/2023  3:32 PM        Passed - Patient is age 12 or older        Passed - Asthma control assessment score within normal limits in last 6 months     Please review ACT score.           Passed - Medication is active on med list        Passed - Recent (6 mo) or future (30 days) visit within the authorizing provider's specialty     Patient had office visit in the last 6 months or has a visit in the next 30 days with authorizing provider or within the authorizing provider's specialty.  See \"Patient Info\" tab in inbasket, or \"Choose Columns\" in Meds & Orders section of the refill encounter.                 Shanti Jade RN 06/28/23 3:33 PM  "

## 2023-06-29 RX ORDER — ALBUTEROL SULFATE 90 UG/1
AEROSOL, METERED RESPIRATORY (INHALATION)
Qty: 18 G | Refills: 1 | Status: SHIPPED | OUTPATIENT
Start: 2023-06-29 | End: 2023-12-19

## 2023-07-03 DIAGNOSIS — J45.909 ASTHMA: ICD-10-CM

## 2023-07-03 RX ORDER — FLUTICASONE PROPIONATE AND SALMETEROL XINAFOATE 230; 21 UG/1; UG/1
AEROSOL, METERED RESPIRATORY (INHALATION)
Qty: 12 G | Refills: 0 | Status: SHIPPED | OUTPATIENT
Start: 2023-07-03 | End: 2023-08-05

## 2023-07-03 NOTE — TELEPHONE ENCOUNTER
"Last Written Prescription Date:  5/29/2023  Last Fill Quantity: 12 g,  # refills: 0   Last office visit provider:  6/6/2023     Requested Prescriptions   Pending Prescriptions Disp Refills     ADVAIR -21 MCG/ACT inhaler [Pharmacy Med Name: ADVAIR -21 MCG INHALER]       Sig: TAKE 2 PUFFS BY MOUTH TWICE A DAY       Inhaled Steroids Protocol Passed - 7/3/2023 12:43 AM        Passed - Patient is age 12 or older        Passed - Asthma control assessment score within normal limits in last 6 months     Please review ACT score.           Passed - Medication is active on med list        Passed - Recent (6 mo) or future (30 days) visit within the authorizing provider's specialty     Patient had office visit in the last 6 months or has a visit in the next 30 days with authorizing provider or within the authorizing provider's specialty.  See \"Patient Info\" tab in inbasket, or \"Choose Columns\" in Meds & Orders section of the refill encounter.           Long-Acting Beta Agonist Inhalers Protocol  Passed - 7/3/2023 12:43 AM        Passed - Patient is age 12 or older        Passed - Asthma control assessment score within normal limits in last 6 months     Please review ACT score.           Passed - Medication is active on med list        Passed - Recent (6 mo) or future (30 days) visit within the authorizing provider's specialty     Patient had office visit in the last 6 months or has a visit in the next 30 days with authorizing provider or within the authorizing provider's specialty.  See \"Patient Info\" tab in inbasket, or \"Choose Columns\" in Meds & Orders section of the refill encounter.                 LIUDMILA NICOLE RN 07/03/23 9:31 AM  "

## 2023-07-12 DIAGNOSIS — G47.09 OTHER INSOMNIA: ICD-10-CM

## 2023-07-12 DIAGNOSIS — K21.9 GERD (GASTROESOPHAGEAL REFLUX DISEASE): ICD-10-CM

## 2023-07-13 RX ORDER — TRAZODONE HYDROCHLORIDE 50 MG/1
TABLET, FILM COATED ORAL
Qty: 180 TABLET | Refills: 3 | Status: SHIPPED | OUTPATIENT
Start: 2023-07-13 | End: 2024-02-05

## 2023-07-13 NOTE — TELEPHONE ENCOUNTER
"Routing refill request to provider for review/approval because:  Provider to approve    Last Written Prescription Date:  8/1/2022  Last Fill Quantity: 180,  # refills: 3   Last office visit provider:  6/6/2023     Requested Prescriptions   Pending Prescriptions Disp Refills    traZODone (DESYREL) 50 MG tablet [Pharmacy Med Name: TRAZODONE 50 MG TABLET] 180 tablet 3     Sig: TAKE 1 TO 2 TABLETS BY MOUTH EVERY DAY AT BEDTIME       Serotonin Modulators Passed - 7/12/2023  8:40 PM        Passed - Recent (12 mo) or future (30 days) visit within the authorizing provider's specialty     Patient has had an office visit with the authorizing provider or a provider within the authorizing providers department within the previous 12 mos or has a future within next 30 days. See \"Patient Info\" tab in inbasket, or \"Choose Columns\" in Meds & Orders section of the refill encounter.              Passed - Medication is active on med list        Passed - Patient is age 18 or older         Refused Prescriptions Disp Refills    omeprazole (PRILOSEC) 20 MG DR capsule [Pharmacy Med Name: OMEPRAZOLE DR 20 MG CAPSULE] 90 capsule 0     Sig: TAKE 1 CAPSULE BY MOUTH EVERY DAY       PPI Protocol Passed - 7/12/2023  8:40 PM        Passed - Not on Clopidogrel (unless Pantoprazole ordered)        Passed - No diagnosis of osteoporosis on record        Passed - Recent (12 mo) or future (30 days) visit within the authorizing provider's specialty     Patient has had an office visit with the authorizing provider or a provider within the authorizing providers department within the previous 12 mos or has a future within next 30 days. See \"Patient Info\" tab in inbasket, or \"Choose Columns\" in Meds & Orders section of the refill encounter.              Passed - Medication is active on med list        Passed - Patient is age 18 or older             LIUDMILA NICOLE RN 07/13/23 12:02 PM  "

## 2023-07-31 NOTE — TELEPHONE ENCOUNTER
Controlled Substance Refill Request  Medication:   Requested Prescriptions     Pending Prescriptions Disp Refills     LORazepam (ATIVAN) 1 MG tablet [Pharmacy Med Name: LORAZEPAM 1 MG TABLET] 10 tablet 0     Sig: TAKE 1 TABLET BY MOUTH EVERY 6 HOURS AS NEEDED FOR ANXIETY. NEEDS OFFICE VISIT.     Date Last Fill: 4/11/19  Pharmacy: [facility]   Submit electronically to pharmacy    Controlled Substance Agreement on File:   Encounter-Level CSA Scan Date:    There are no encounter-level csa scan date.       Last office visit with primary: 9/25/2018     Patient called stating she was having soreness to her rib area after her recent fall. Xrays reviewed and were negative for fracture. She denies any other symptoms. Recommend she follow up with her PCP and continue to use tylenol, ice, and pain medicated rubs as needed for the pain. Also noted an unsuccessful attempt at reaching patient to make aware of L proximal fifth phalanx fracture. Discussed with patient and recommend follow up with ortho. Number provided. All questions answered and verbalized understanding.

## 2023-08-05 DIAGNOSIS — J45.909 ASTHMA: ICD-10-CM

## 2023-08-05 RX ORDER — FLUTICASONE PROPIONATE AND SALMETEROL XINAFOATE 230; 21 UG/1; UG/1
AEROSOL, METERED RESPIRATORY (INHALATION)
Qty: 12 G | Refills: 3 | Status: SHIPPED | OUTPATIENT
Start: 2023-08-05 | End: 2024-01-10

## 2023-08-06 NOTE — TELEPHONE ENCOUNTER
"Last Written Prescription Date:  7/3/2023  Last Fill Quantity: 12g,  # refills: 0   Last office visit provider:  6/6/2023     Requested Prescriptions   Pending Prescriptions Disp Refills    ADVAIR -21 MCG/ACT inhaler [Pharmacy Med Name: ADVAIR -21 MCG INHALER]       Sig: TAKE 2 PUFFS BY MOUTH TWICE A DAY       Inhaled Steroids Protocol Passed - 8/5/2023  8:01 AM        Passed - Patient is age 12 or older        Passed - Asthma control assessment score within normal limits in last 6 months     Please review ACT score.           Passed - Medication is active on med list        Passed - Recent (6 mo) or future (30 days) visit within the authorizing provider's specialty     Patient had office visit in the last 6 months or has a visit in the next 30 days with authorizing provider or within the authorizing provider's specialty.  See \"Patient Info\" tab in inbasket, or \"Choose Columns\" in Meds & Orders section of the refill encounter.           Long-Acting Beta Agonist Inhalers Protocol  Passed - 8/5/2023  8:01 AM        Passed - Patient is age 12 or older        Passed - Asthma control assessment score within normal limits in last 6 months     Please review ACT score.           Passed - Medication is active on med list        Passed - Recent (6 mo) or future (30 days) visit within the authorizing provider's specialty     Patient had office visit in the last 6 months or has a visit in the next 30 days with authorizing provider or within the authorizing provider's specialty.  See \"Patient Info\" tab in inbasket, or \"Choose Columns\" in Meds & Orders section of the refill encounter.                 Maria Del Carmen Galarza RN 08/05/23 9:55 PM  "

## 2023-09-06 DIAGNOSIS — F41.1 ANXIETY STATE: ICD-10-CM

## 2023-09-06 DIAGNOSIS — F41.8 SITUATIONAL ANXIETY: ICD-10-CM

## 2023-09-06 DIAGNOSIS — K21.9 GERD (GASTROESOPHAGEAL REFLUX DISEASE): ICD-10-CM

## 2023-09-06 RX ORDER — LORAZEPAM 1 MG/1
1 TABLET ORAL EVERY 6 HOURS PRN
Qty: 10 TABLET | Refills: 0 | Status: SHIPPED | OUTPATIENT
Start: 2023-09-06 | End: 2023-10-27

## 2023-09-06 NOTE — TELEPHONE ENCOUNTER
Medication Question or Refill        What medication are you calling about (include dose and sig)?:   LORazepam (ATIVAN) 1 MG tablet     omeprazole (PRILOSEC) 20 MG DR capsule       Preferred Pharmacy:   [facility] [address] [place] [facility] [address] [facility]  [address] [facility] [address]  Phone: [phone] Fax: [phone]      Controlled Substance Agreement on file:   CSA -- Patient Level:    CSA: None found at the patient level.       Who prescribed the medication?:   Anibal Staton MD       Do you need a refill? Yes    When did you use the medication last? Over the past few weeks    Patient offered an appointment? No    Do you have any questions or concerns?  No      Okay to leave a detailed message?: No at Cell number on file:    Telephone Information:   Mobile [phone]

## 2023-10-21 ENCOUNTER — HEALTH MAINTENANCE LETTER (OUTPATIENT)
Age: 43
End: 2023-10-21

## 2023-10-27 ENCOUNTER — MYC REFILL (OUTPATIENT)
Dept: FAMILY MEDICINE | Facility: CLINIC | Age: 43
End: 2023-10-27
Payer: COMMERCIAL

## 2023-10-27 DIAGNOSIS — F41.8 SITUATIONAL ANXIETY: ICD-10-CM

## 2023-10-27 DIAGNOSIS — J45.909 ASTHMA: ICD-10-CM

## 2023-10-27 DIAGNOSIS — F41.1 ANXIETY STATE: ICD-10-CM

## 2023-10-27 DIAGNOSIS — B00.9 HERPES SIMPLEX VIRUS INFECTION: Primary | ICD-10-CM

## 2023-10-27 RX ORDER — FLUTICASONE PROPIONATE AND SALMETEROL XINAFOATE 230; 21 UG/1; UG/1
2 AEROSOL, METERED RESPIRATORY (INHALATION) 2 TIMES DAILY
Qty: 12 G | Refills: 3 | Status: CANCELLED | OUTPATIENT
Start: 2023-10-27

## 2023-10-30 RX ORDER — LORAZEPAM 1 MG/1
1 TABLET ORAL EVERY 6 HOURS PRN
Qty: 10 TABLET | Refills: 0 | Status: SHIPPED | OUTPATIENT
Start: 2023-10-30 | End: 2023-12-19

## 2023-10-30 RX ORDER — VALACYCLOVIR HYDROCHLORIDE 1 G/1
2000 TABLET, FILM COATED ORAL 2 TIMES DAILY
Qty: 60 TABLET | Refills: 3 | Status: SHIPPED | OUTPATIENT
Start: 2023-10-30

## 2023-12-03 DIAGNOSIS — K21.9 GERD (GASTROESOPHAGEAL REFLUX DISEASE): ICD-10-CM

## 2023-12-19 ENCOUNTER — MYC REFILL (OUTPATIENT)
Dept: FAMILY MEDICINE | Facility: CLINIC | Age: 43
End: 2023-12-19
Payer: COMMERCIAL

## 2023-12-19 DIAGNOSIS — F41.1 ANXIETY STATE: ICD-10-CM

## 2023-12-19 DIAGNOSIS — G47.09 OTHER INSOMNIA: ICD-10-CM

## 2023-12-19 DIAGNOSIS — J45.30 MILD PERSISTENT ASTHMA, UNCOMPLICATED: ICD-10-CM

## 2023-12-19 DIAGNOSIS — F41.8 SITUATIONAL ANXIETY: ICD-10-CM

## 2023-12-20 RX ORDER — ALBUTEROL SULFATE 90 UG/1
2 AEROSOL, METERED RESPIRATORY (INHALATION) EVERY 6 HOURS PRN
Qty: 18 G | Refills: 0 | Status: SHIPPED | OUTPATIENT
Start: 2023-12-20 | End: 2024-01-22

## 2023-12-20 RX ORDER — TRAZODONE HYDROCHLORIDE 50 MG/1
TABLET, FILM COATED ORAL
Qty: 180 TABLET | Refills: 3 | OUTPATIENT
Start: 2023-12-20

## 2023-12-20 RX ORDER — LORAZEPAM 1 MG/1
1 TABLET ORAL EVERY 6 HOURS PRN
Qty: 10 TABLET | Refills: 0 | Status: SHIPPED | OUTPATIENT
Start: 2023-12-20 | End: 2024-02-05

## 2024-01-10 ENCOUNTER — MYC REFILL (OUTPATIENT)
Dept: FAMILY MEDICINE | Facility: CLINIC | Age: 44
End: 2024-01-10
Payer: COMMERCIAL

## 2024-01-10 DIAGNOSIS — G47.09 OTHER INSOMNIA: ICD-10-CM

## 2024-01-10 DIAGNOSIS — J45.30 MILD PERSISTENT ASTHMA WITHOUT COMPLICATION: ICD-10-CM

## 2024-01-11 RX ORDER — TRAZODONE HYDROCHLORIDE 50 MG/1
TABLET, FILM COATED ORAL
Qty: 180 TABLET | Refills: 3 | OUTPATIENT
Start: 2024-01-11

## 2024-01-11 RX ORDER — FLUTICASONE PROPIONATE AND SALMETEROL XINAFOATE 230; 21 UG/1; UG/1
AEROSOL, METERED RESPIRATORY (INHALATION)
Qty: 12 G | Refills: 3 | Status: SHIPPED | OUTPATIENT
Start: 2024-01-11

## 2024-01-20 DIAGNOSIS — J45.30 MILD PERSISTENT ASTHMA, UNCOMPLICATED: ICD-10-CM

## 2024-01-22 RX ORDER — ALBUTEROL SULFATE 90 UG/1
2 AEROSOL, METERED RESPIRATORY (INHALATION) EVERY 6 HOURS PRN
Qty: 18 G | Refills: 1 | Status: SHIPPED | OUTPATIENT
Start: 2024-01-22 | End: 2024-02-05

## 2024-02-05 ENCOUNTER — MYC REFILL (OUTPATIENT)
Dept: FAMILY MEDICINE | Facility: CLINIC | Age: 44
End: 2024-02-05
Payer: COMMERCIAL

## 2024-02-05 DIAGNOSIS — J45.30 MILD PERSISTENT ASTHMA, UNCOMPLICATED: ICD-10-CM

## 2024-02-05 DIAGNOSIS — K21.9 GERD (GASTROESOPHAGEAL REFLUX DISEASE): ICD-10-CM

## 2024-02-05 DIAGNOSIS — G47.09 OTHER INSOMNIA: ICD-10-CM

## 2024-02-05 DIAGNOSIS — F41.8 SITUATIONAL ANXIETY: ICD-10-CM

## 2024-02-05 DIAGNOSIS — K21.9 GASTROESOPHAGEAL REFLUX DISEASE WITHOUT ESOPHAGITIS: Primary | ICD-10-CM

## 2024-02-05 DIAGNOSIS — F41.1 ANXIETY STATE: ICD-10-CM

## 2024-02-05 RX ORDER — TRAZODONE HYDROCHLORIDE 50 MG/1
TABLET, FILM COATED ORAL
Qty: 180 TABLET | Refills: 3 | Status: SHIPPED | OUTPATIENT
Start: 2024-02-05

## 2024-02-05 RX ORDER — LORAZEPAM 1 MG/1
1 TABLET ORAL EVERY 6 HOURS PRN
Qty: 10 TABLET | Refills: 0 | Status: SHIPPED | OUTPATIENT
Start: 2024-02-05 | End: 2024-03-13

## 2024-02-05 RX ORDER — ALBUTEROL SULFATE 90 UG/1
2 AEROSOL, METERED RESPIRATORY (INHALATION) EVERY 6 HOURS PRN
Qty: 18 G | Refills: 1 | Status: SHIPPED | OUTPATIENT
Start: 2024-02-05

## 2024-03-08 ENCOUNTER — MYC REFILL (OUTPATIENT)
Dept: FAMILY MEDICINE | Facility: CLINIC | Age: 44
End: 2024-03-08
Payer: COMMERCIAL

## 2024-03-08 DIAGNOSIS — J45.30 MILD PERSISTENT ASTHMA, UNCOMPLICATED: ICD-10-CM

## 2024-03-08 DIAGNOSIS — G47.09 OTHER INSOMNIA: ICD-10-CM

## 2024-03-08 RX ORDER — TRAZODONE HYDROCHLORIDE 50 MG/1
TABLET, FILM COATED ORAL
Qty: 180 TABLET | Refills: 3 | OUTPATIENT
Start: 2024-03-08

## 2024-03-08 RX ORDER — ALBUTEROL SULFATE 90 UG/1
2 AEROSOL, METERED RESPIRATORY (INHALATION) EVERY 6 HOURS PRN
Qty: 18 G | Refills: 1 | OUTPATIENT
Start: 2024-03-08

## 2024-03-13 ENCOUNTER — MYC REFILL (OUTPATIENT)
Dept: FAMILY MEDICINE | Facility: CLINIC | Age: 44
End: 2024-03-13
Payer: COMMERCIAL

## 2024-03-13 DIAGNOSIS — F41.1 ANXIETY STATE: ICD-10-CM

## 2024-03-13 DIAGNOSIS — F41.8 SITUATIONAL ANXIETY: ICD-10-CM

## 2024-03-14 RX ORDER — LORAZEPAM 1 MG/1
1 TABLET ORAL EVERY 6 HOURS PRN
Qty: 10 TABLET | Refills: 0 | Status: SHIPPED | OUTPATIENT
Start: 2024-03-14 | End: 2024-05-03

## 2024-05-03 DIAGNOSIS — F41.8 SITUATIONAL ANXIETY: ICD-10-CM

## 2024-05-03 DIAGNOSIS — F41.1 ANXIETY STATE: ICD-10-CM

## 2024-05-03 RX ORDER — LORAZEPAM 1 MG/1
1 TABLET ORAL EVERY 6 HOURS
Qty: 10 TABLET | Refills: 0 | Status: SHIPPED | OUTPATIENT
Start: 2024-05-03 | End: 2024-08-08

## 2024-08-08 DIAGNOSIS — F41.1 ANXIETY STATE: ICD-10-CM

## 2024-08-08 DIAGNOSIS — F41.8 SITUATIONAL ANXIETY: ICD-10-CM

## 2024-08-08 RX ORDER — LORAZEPAM 1 MG/1
1 TABLET ORAL EVERY 6 HOURS
Qty: 5 TABLET | Refills: 0 | Status: SHIPPED | OUTPATIENT
Start: 2024-08-08

## 2024-10-10 DIAGNOSIS — F41.1 ANXIETY STATE: ICD-10-CM

## 2024-10-10 DIAGNOSIS — F41.8 SITUATIONAL ANXIETY: ICD-10-CM

## 2024-10-10 RX ORDER — LORAZEPAM 1 MG/1
1 TABLET ORAL EVERY 6 HOURS
Qty: 5 TABLET | Refills: 0 | OUTPATIENT
Start: 2024-10-10

## 2024-10-22 ENCOUNTER — MYC REFILL (OUTPATIENT)
Dept: FAMILY MEDICINE | Facility: CLINIC | Age: 44
End: 2024-10-22
Payer: COMMERCIAL

## 2024-10-22 DIAGNOSIS — F41.1 ANXIETY STATE: ICD-10-CM

## 2024-10-22 DIAGNOSIS — F41.8 SITUATIONAL ANXIETY: ICD-10-CM

## 2024-10-22 DIAGNOSIS — J45.30 MILD PERSISTENT ASTHMA WITHOUT COMPLICATION: ICD-10-CM

## 2024-10-23 RX ORDER — FLUTICASONE PROPIONATE AND SALMETEROL XINAFOATE 230; 21 UG/1; UG/1
AEROSOL, METERED RESPIRATORY (INHALATION)
Qty: 12 G | Refills: 0 | Status: SHIPPED | OUTPATIENT
Start: 2024-10-23

## 2024-10-23 RX ORDER — LORAZEPAM 1 MG/1
1 TABLET ORAL EVERY 6 HOURS
Qty: 5 TABLET | Refills: 0 | Status: SHIPPED | OUTPATIENT
Start: 2024-10-23

## 2024-11-04 DIAGNOSIS — K21.9 GASTROESOPHAGEAL REFLUX DISEASE WITHOUT ESOPHAGITIS: ICD-10-CM

## 2024-12-12 ENCOUNTER — MYC REFILL (OUTPATIENT)
Dept: FAMILY MEDICINE | Facility: CLINIC | Age: 44
End: 2024-12-12
Payer: COMMERCIAL

## 2024-12-12 DIAGNOSIS — F41.8 SITUATIONAL ANXIETY: ICD-10-CM

## 2024-12-12 DIAGNOSIS — F41.1 ANXIETY STATE: ICD-10-CM

## 2024-12-12 DIAGNOSIS — B00.9 HERPES SIMPLEX VIRUS INFECTION: ICD-10-CM

## 2024-12-12 DIAGNOSIS — J45.30 MILD PERSISTENT ASTHMA, UNCOMPLICATED: ICD-10-CM

## 2024-12-12 RX ORDER — ALBUTEROL SULFATE 90 UG/1
2 INHALANT RESPIRATORY (INHALATION) EVERY 6 HOURS PRN
Qty: 18 G | Refills: 0 | Status: SHIPPED | OUTPATIENT
Start: 2024-12-12

## 2024-12-12 RX ORDER — LORAZEPAM 1 MG/1
1 TABLET ORAL EVERY 6 HOURS
Qty: 5 TABLET | Refills: 0 | OUTPATIENT
Start: 2024-12-12

## 2024-12-12 RX ORDER — VALACYCLOVIR HYDROCHLORIDE 1 G/1
2000 TABLET, FILM COATED ORAL 2 TIMES DAILY
Qty: 60 TABLET | Refills: 0 | Status: SHIPPED | OUTPATIENT
Start: 2024-12-12

## 2024-12-14 ENCOUNTER — HEALTH MAINTENANCE LETTER (OUTPATIENT)
Age: 44
End: 2024-12-14

## 2024-12-24 ENCOUNTER — VIRTUAL VISIT (OUTPATIENT)
Dept: FAMILY MEDICINE | Facility: CLINIC | Age: 44
End: 2024-12-24
Payer: COMMERCIAL

## 2024-12-24 DIAGNOSIS — G89.29 CHRONIC BILATERAL LOW BACK PAIN WITHOUT SCIATICA: ICD-10-CM

## 2024-12-24 DIAGNOSIS — M54.50 CHRONIC BILATERAL LOW BACK PAIN WITHOUT SCIATICA: ICD-10-CM

## 2024-12-24 DIAGNOSIS — F41.8 SITUATIONAL ANXIETY: Primary | ICD-10-CM

## 2024-12-24 DIAGNOSIS — J45.30 MILD PERSISTENT ASTHMA WITHOUT COMPLICATION: ICD-10-CM

## 2024-12-24 PROCEDURE — 99213 OFFICE O/P EST LOW 20 MIN: CPT | Mod: 95 | Performed by: FAMILY MEDICINE

## 2024-12-24 RX ORDER — LORAZEPAM 1 MG/1
1 TABLET ORAL EVERY 6 HOURS
Qty: 30 TABLET | Refills: 0 | Status: SHIPPED | OUTPATIENT
Start: 2024-12-24

## 2024-12-24 RX ORDER — FLUTICASONE PROPIONATE AND SALMETEROL XINAFOATE 230; 21 UG/1; UG/1
AEROSOL, METERED RESPIRATORY (INHALATION)
Qty: 12 G | Refills: 3 | Status: SHIPPED | OUTPATIENT
Start: 2024-12-24

## 2024-12-24 NOTE — PROGRESS NOTES
Tavon is a 44 year old who is being evaluated via a billable video visit.    How would you like to obtain your AVS? MyChart  If the video visit is dropped, the invitation should be resent by: Text to cell phone: [phone]  Will anyone else be joining your video visit? No      Tavon was seen today for recheck medication.    Diagnoses and all orders for this visit:    Situational anxiety  -     LORazepam (ATIVAN) 1 MG tablet; Take 1 tablet (1 mg) by mouth every 6 hours. Schedule a follow-up visit, last visit June 2023    Mild persistent asthma without complication  -     fluticasone-salmeterol (ADVAIR HFA) 230-21 MCG/ACT inhaler; TAKE 2 PUFFS BY MOUTH TWICE A DAY    Chronic bilateral low back pain without sciatica  -     Spine  Referral; Future           Subjective   Tavon is a 44 year old, presenting for the following health issues:  No chief complaint on file.    HPI     He has mild persistent asthma reported to be under good control with current controller medication.  Reports rare use of rescue inhaler.  Triggers can be exercise, environmental allergens or respiratory infections.    He has situational anxiety.  A lot of anxiety stems from flying, a specific phobia as well as work-related stress.  He uses lorazepam on the average of 7 to 10 tablets/month.  No evidence of misuse or diversion of medication.  We discussed the medication in great detail including the potential risks associated with use of the medication.  Given the intermittent nature of his anxiety symptoms we elected to continue with this  as needed approach to treatment.      He does continue to use trazodone for sleep at night.  Reports no significant ongoing sleep difficulties.    He has chronic low back pain.  Does not report neurologic symptoms or any concerns discussed referral to orthopedic specialty provider to further address back pain issues.      Complete review of systems is obtained.  Other than the specific  considerations noted above complete review of systems is negative.        Objective           Vitals:  No vitals were obtained today due to virtual visit.    Physical Exam   GENERAL: alert and no distress  EYES: Eyes grossly normal to inspection.  No discharge or erythema, or obvious scleral/conjunctival abnormalities.  RESP: No audible wheeze, cough, or visible cyanosis.    SKIN: Visible skin clear. No significant rash, abnormal pigmentation or lesions.  NEURO: Cranial nerves grossly intact.  Mentation and speech appropriate for age.  PSYCH: Appropriate affect, tone, and pace of words          Video-Visit Details    Type of service:  Video Visit   Originating Location (pt. Location): Home    Distant Location (provider location):  On-site  Platform used for Video Visit: [employer]  Signed Electronically by: Anibal Staton MD, MD

## 2025-01-07 DIAGNOSIS — J45.30 MILD PERSISTENT ASTHMA, UNCOMPLICATED: ICD-10-CM

## 2025-01-07 RX ORDER — ALBUTEROL SULFATE 90 UG/1
2 INHALANT RESPIRATORY (INHALATION) EVERY 6 HOURS PRN
Qty: 8 G | Refills: 5 | Status: SHIPPED | OUTPATIENT
Start: 2025-01-07

## 2025-02-16 DIAGNOSIS — K21.9 GASTROESOPHAGEAL REFLUX DISEASE WITHOUT ESOPHAGITIS: ICD-10-CM

## 2025-02-17 RX ORDER — OMEPRAZOLE 20 MG/1
20 CAPSULE, DELAYED RELEASE ORAL DAILY
Qty: 90 CAPSULE | Refills: 2 | Status: SHIPPED | OUTPATIENT
Start: 2025-02-17

## 2025-03-03 ENCOUNTER — MYC REFILL (OUTPATIENT)
Dept: FAMILY MEDICINE | Facility: CLINIC | Age: 45
End: 2025-03-03
Payer: COMMERCIAL

## 2025-03-03 DIAGNOSIS — G47.09 OTHER INSOMNIA: ICD-10-CM

## 2025-03-03 DIAGNOSIS — J45.30 MILD PERSISTENT ASTHMA, UNCOMPLICATED: ICD-10-CM

## 2025-03-03 DIAGNOSIS — J45.30 MILD PERSISTENT ASTHMA WITHOUT COMPLICATION: ICD-10-CM

## 2025-03-03 DIAGNOSIS — F41.8 SITUATIONAL ANXIETY: ICD-10-CM

## 2025-03-03 RX ORDER — LORAZEPAM 1 MG/1
1 TABLET ORAL EVERY 6 HOURS
Qty: 30 TABLET | Refills: 0 | Status: SHIPPED | OUTPATIENT
Start: 2025-03-03

## 2025-03-03 RX ORDER — TRAZODONE HYDROCHLORIDE 50 MG/1
TABLET ORAL
Qty: 180 TABLET | Refills: 3 | Status: SHIPPED | OUTPATIENT
Start: 2025-03-03

## 2025-03-03 RX ORDER — FLUTICASONE PROPIONATE AND SALMETEROL XINAFOATE 230; 21 UG/1; UG/1
AEROSOL, METERED RESPIRATORY (INHALATION)
Qty: 12 G | Refills: 3 | Status: SHIPPED | OUTPATIENT
Start: 2025-03-03

## 2025-03-03 RX ORDER — ALBUTEROL SULFATE 90 UG/1
2 INHALANT RESPIRATORY (INHALATION) EVERY 6 HOURS PRN
Qty: 8 G | Refills: 5 | Status: SHIPPED | OUTPATIENT
Start: 2025-03-03

## 2025-03-07 ENCOUNTER — MYC MEDICAL ADVICE (OUTPATIENT)
Dept: FAMILY MEDICINE | Facility: CLINIC | Age: 45
End: 2025-03-07
Payer: COMMERCIAL

## 2025-03-07 DIAGNOSIS — J45.30 MILD PERSISTENT ASTHMA, UNCOMPLICATED: ICD-10-CM

## 2025-03-07 DIAGNOSIS — J45.30 MILD PERSISTENT ASTHMA WITHOUT COMPLICATION: ICD-10-CM

## 2025-03-07 DIAGNOSIS — F41.8 SITUATIONAL ANXIETY: ICD-10-CM

## 2025-03-16 RX ORDER — LORAZEPAM 1 MG/1
1 TABLET ORAL EVERY 6 HOURS
Qty: 30 TABLET | Refills: 0 | Status: SHIPPED | OUTPATIENT
Start: 2025-03-16

## 2025-03-16 RX ORDER — FLUTICASONE PROPIONATE AND SALMETEROL XINAFOATE 230; 21 UG/1; UG/1
AEROSOL, METERED RESPIRATORY (INHALATION)
Qty: 12 G | Refills: 3 | Status: SHIPPED | OUTPATIENT
Start: 2025-03-16

## 2025-03-16 RX ORDER — ALBUTEROL SULFATE 90 UG/1
2 INHALANT RESPIRATORY (INHALATION) EVERY 6 HOURS PRN
Qty: 8 G | Refills: 5 | Status: SHIPPED | OUTPATIENT
Start: 2025-03-16

## 2025-06-15 ENCOUNTER — MYC REFILL (OUTPATIENT)
Dept: FAMILY MEDICINE | Facility: CLINIC | Age: 45
End: 2025-06-15
Payer: COMMERCIAL

## 2025-06-15 DIAGNOSIS — B00.9 HERPES SIMPLEX VIRUS INFECTION: ICD-10-CM

## 2025-06-15 DIAGNOSIS — F41.8 SITUATIONAL ANXIETY: ICD-10-CM

## 2025-06-15 DIAGNOSIS — G47.09 OTHER INSOMNIA: ICD-10-CM

## 2025-06-15 DIAGNOSIS — J45.30 MILD PERSISTENT ASTHMA, UNCOMPLICATED: ICD-10-CM

## 2025-06-15 DIAGNOSIS — J45.30 MILD PERSISTENT ASTHMA WITHOUT COMPLICATION: ICD-10-CM

## 2025-06-16 ENCOUNTER — MYC REFILL (OUTPATIENT)
Dept: FAMILY MEDICINE | Facility: CLINIC | Age: 45
End: 2025-06-16
Payer: COMMERCIAL

## 2025-06-16 DIAGNOSIS — J45.30 MILD PERSISTENT ASTHMA, UNCOMPLICATED: ICD-10-CM

## 2025-06-16 DIAGNOSIS — F41.8 SITUATIONAL ANXIETY: ICD-10-CM

## 2025-06-16 DIAGNOSIS — G47.09 OTHER INSOMNIA: ICD-10-CM

## 2025-06-16 DIAGNOSIS — B00.9 HERPES SIMPLEX VIRUS INFECTION: ICD-10-CM

## 2025-06-16 DIAGNOSIS — J45.30 MILD PERSISTENT ASTHMA WITHOUT COMPLICATION: ICD-10-CM

## 2025-06-16 RX ORDER — ALBUTEROL SULFATE 90 UG/1
2 INHALANT RESPIRATORY (INHALATION) EVERY 6 HOURS PRN
Qty: 8 G | Refills: 5 | Status: SHIPPED | OUTPATIENT
Start: 2025-06-16

## 2025-06-16 RX ORDER — LORAZEPAM 1 MG/1
1 TABLET ORAL EVERY 6 HOURS
Qty: 30 TABLET | Refills: 0 | Status: SHIPPED | OUTPATIENT
Start: 2025-06-16

## 2025-06-16 RX ORDER — FLUTICASONE PROPIONATE AND SALMETEROL XINAFOATE 230; 21 UG/1; UG/1
AEROSOL, METERED RESPIRATORY (INHALATION)
Qty: 12 G | Refills: 3 | Status: SHIPPED | OUTPATIENT
Start: 2025-06-16

## 2025-06-16 RX ORDER — VALACYCLOVIR HYDROCHLORIDE 1 G/1
2000 TABLET, FILM COATED ORAL 2 TIMES DAILY
Qty: 60 TABLET | Refills: 0 | Status: SHIPPED | OUTPATIENT
Start: 2025-06-16

## 2025-06-16 RX ORDER — TRAZODONE HYDROCHLORIDE 50 MG/1
TABLET ORAL
Qty: 180 TABLET | Refills: 3 | Status: SHIPPED | OUTPATIENT
Start: 2025-06-16

## 2025-06-17 RX ORDER — TRAZODONE HYDROCHLORIDE 50 MG/1
TABLET ORAL
Qty: 180 TABLET | Refills: 3 | OUTPATIENT
Start: 2025-06-17

## 2025-06-17 RX ORDER — ALBUTEROL SULFATE 90 UG/1
2 INHALANT RESPIRATORY (INHALATION) EVERY 6 HOURS PRN
Qty: 8 G | Refills: 5 | OUTPATIENT
Start: 2025-06-17

## 2025-06-17 RX ORDER — FLUTICASONE PROPIONATE AND SALMETEROL XINAFOATE 230; 21 UG/1; UG/1
AEROSOL, METERED RESPIRATORY (INHALATION)
Qty: 12 G | Refills: 3 | OUTPATIENT
Start: 2025-06-17

## 2025-06-17 RX ORDER — VALACYCLOVIR HYDROCHLORIDE 1 G/1
2000 TABLET, FILM COATED ORAL 2 TIMES DAILY
Qty: 60 TABLET | Refills: 0 | OUTPATIENT
Start: 2025-06-17

## 2025-06-17 RX ORDER — LORAZEPAM 1 MG/1
1 TABLET ORAL EVERY 6 HOURS
Qty: 30 TABLET | Refills: 0 | OUTPATIENT
Start: 2025-06-17

## 2025-06-17 NOTE — TELEPHONE ENCOUNTER
This refill request is a duplicate request, previously received or sent.  Sent denial notification to pharmacy.    CARLITOS FlorentinoN, RN  [facility]

## 2025-09-03 ENCOUNTER — MYC REFILL (OUTPATIENT)
Dept: FAMILY MEDICINE | Facility: CLINIC | Age: 45
End: 2025-09-03
Payer: COMMERCIAL

## 2025-09-03 DIAGNOSIS — G47.09 OTHER INSOMNIA: ICD-10-CM

## 2025-09-03 DIAGNOSIS — J45.30 MILD PERSISTENT ASTHMA, UNCOMPLICATED: ICD-10-CM

## 2025-09-03 DIAGNOSIS — F41.8 SITUATIONAL ANXIETY: ICD-10-CM

## 2025-09-03 RX ORDER — ALBUTEROL SULFATE 90 UG/1
2 INHALANT RESPIRATORY (INHALATION) EVERY 6 HOURS PRN
Qty: 8 G | Refills: 5 | OUTPATIENT
Start: 2025-09-03

## 2025-09-03 RX ORDER — LORAZEPAM 1 MG/1
1 TABLET ORAL EVERY 6 HOURS
Qty: 30 TABLET | Refills: 0 | Status: SHIPPED | OUTPATIENT
Start: 2025-09-03

## 2025-09-03 RX ORDER — TRAZODONE HYDROCHLORIDE 50 MG/1
TABLET ORAL
Qty: 180 TABLET | Refills: 3 | OUTPATIENT
Start: 2025-09-03